# Patient Record
Sex: FEMALE | Race: OTHER | HISPANIC OR LATINO | Employment: FULL TIME | URBAN - METROPOLITAN AREA
[De-identification: names, ages, dates, MRNs, and addresses within clinical notes are randomized per-mention and may not be internally consistent; named-entity substitution may affect disease eponyms.]

---

## 2017-01-27 ENCOUNTER — CONVERSION ENCOUNTER (OUTPATIENT)
Dept: MAMMOGRAPHY | Facility: CLINIC | Age: 57
End: 2017-01-27

## 2017-01-31 LAB
25(OH)D3 SERPL-MCNC: 65 NG/ML (ref 30–100)
ALBUMIN SERPL-MCNC: 4.3 G/DL (ref 3.6–5.1)
ALBUMIN/GLOB SERPL: 1.8 (CALC) (ref 1–2.5)
ALP SERPL-CCNC: 109 U/L (ref 33–130)
ALT SERPL-CCNC: 11 U/L (ref 6–29)
AST SERPL-CCNC: 18 U/L (ref 10–35)
BASOPHILS # BLD AUTO: 11 CELLS/UL (ref 0–200)
BASOPHILS NFR BLD AUTO: 0.2 %
BILIRUB SERPL-MCNC: 0.4 MG/DL (ref 0.2–1.2)
BUN SERPL-MCNC: 17 MG/DL (ref 7–25)
BUN/CREAT SERPL: NORMAL (CALC) (ref 6–22)
CALCIUM SERPL-MCNC: 9.2 MG/DL (ref 8.6–10.4)
CHLORIDE SERPL-SCNC: 106 MMOL/L (ref 98–110)
CHOLEST SERPL-MCNC: 222 MG/DL (ref 125–200)
CHOLEST/HDLC SERPL: 2.7 (CALC)
CO2 SERPL-SCNC: 23 MMOL/L (ref 20–31)
CREAT SERPL-MCNC: 0.68 MG/DL (ref 0.5–1.05)
EOSINOPHIL # BLD AUTO: 62 CELLS/UL (ref 15–500)
EOSINOPHIL NFR BLD AUTO: 1.1 %
ERYTHROCYTE [DISTWIDTH] IN BLOOD BY AUTOMATED COUNT: 14.2 % (ref 11–15)
GLOBULIN SER CALC-MCNC: 2.4 G/DL (CALC) (ref 1.9–3.7)
GLUCOSE SERPL-MCNC: 78 MG/DL (ref 65–99)
HBA1C MFR BLD: 5.6 % OF TOTAL HGB
HCT VFR BLD AUTO: 38.5 % (ref 35–45)
HDLC SERPL-MCNC: 81 MG/DL
HGB BLD-MCNC: 12.9 G/DL (ref 11.7–15.5)
LDLC SERPL CALC-MCNC: 126 MG/DL (CALC)
LYMPHOCYTES # BLD AUTO: 2279 CELLS/UL (ref 850–3900)
LYMPHOCYTES NFR BLD AUTO: 40.7 %
MCH RBC QN AUTO: 30.4 PG (ref 27–33)
MCHC RBC AUTO-ENTMCNC: 33.7 G/DL (ref 32–36)
MCV RBC AUTO: 90.4 FL (ref 80–100)
MONOCYTES # BLD AUTO: 358 CELLS/UL (ref 200–950)
MONOCYTES NFR BLD AUTO: 6.4 %
NEUTROPHILS # BLD AUTO: 2890 CELLS/UL (ref 1500–7800)
NEUTROPHILS NFR BLD AUTO: 51.6 %
NONHDLC SERPL-MCNC: 141 MG/DL (CALC)
PLATELET # BLD AUTO: 223 THOUSAND/UL (ref 140–400)
PMV BLD REES-ECKER: 11.6 FL (ref 7.5–12.5)
POTASSIUM SERPL-SCNC: 4.7 MMOL/L (ref 3.5–5.3)
PROT SERPL-MCNC: 6.7 G/DL (ref 6.1–8.1)
RBC # BLD AUTO: 4.26 MILLION/UL (ref 3.8–5.1)
SL AMB EGFR AFRICAN AMERICAN: 112 ML/MIN/1.73M2
SL AMB EGFR NON AFRICAN AMERICAN: 96 ML/MIN/1.73M2
SODIUM SERPL-SCNC: 142 MMOL/L (ref 135–146)
TRIGL SERPL-MCNC: 74 MG/DL
TSH SERPL-ACNC: 0.92 MIU/L (ref 0.4–4.5)
WBC # BLD AUTO: 5.6 THOUSAND/UL (ref 3.8–10.8)

## 2018-01-31 ENCOUNTER — CONVERSION ENCOUNTER (OUTPATIENT)
Dept: MAMMOGRAPHY | Facility: CLINIC | Age: 58
End: 2018-01-31

## 2018-03-05 LAB — HBA1C MFR BLD HPLC: 5 %

## 2018-07-28 LAB
ALBUMIN SERPL-MCNC: 4.1 G/DL (ref 3.6–5.1)
ALBUMIN/GLOB SERPL: 1.9 (CALC) (ref 1–2.5)
ALP SERPL-CCNC: 99 U/L (ref 33–130)
ALT SERPL-CCNC: 10 U/L (ref 6–29)
AST SERPL-CCNC: 12 U/L (ref 10–35)
BASOPHILS # BLD AUTO: 51 CELLS/UL (ref 0–200)
BASOPHILS NFR BLD AUTO: 1 %
BILIRUB SERPL-MCNC: 0.5 MG/DL (ref 0.2–1.2)
BUN SERPL-MCNC: 20 MG/DL (ref 7–25)
BUN/CREAT SERPL: NORMAL (CALC) (ref 6–22)
CALCIUM SERPL-MCNC: 9.2 MG/DL (ref 8.6–10.4)
CHLORIDE SERPL-SCNC: 106 MMOL/L (ref 98–110)
CHOLEST SERPL-MCNC: 205 MG/DL
CHOLEST/HDLC SERPL: 2.7 (CALC)
CO2 SERPL-SCNC: 29 MMOL/L (ref 20–31)
CREAT SERPL-MCNC: 0.73 MG/DL (ref 0.5–1.05)
EOSINOPHIL # BLD AUTO: 82 CELLS/UL (ref 15–500)
EOSINOPHIL NFR BLD AUTO: 1.6 %
ERYTHROCYTE [DISTWIDTH] IN BLOOD BY AUTOMATED COUNT: 12.3 % (ref 11–15)
GLOBULIN SER CALC-MCNC: 2.2 G/DL (CALC) (ref 1.9–3.7)
GLUCOSE SERPL-MCNC: 85 MG/DL (ref 65–99)
HCT VFR BLD AUTO: 36.5 % (ref 35–45)
HDLC SERPL-MCNC: 77 MG/DL
HGB BLD-MCNC: 12 G/DL (ref 11.7–15.5)
LDLC SERPL CALC-MCNC: 110 MG/DL (CALC)
LYMPHOCYTES # BLD AUTO: 1678 CELLS/UL (ref 850–3900)
LYMPHOCYTES NFR BLD AUTO: 32.9 %
MCH RBC QN AUTO: 31 PG (ref 27–33)
MCHC RBC AUTO-ENTMCNC: 32.9 G/DL (ref 32–36)
MCV RBC AUTO: 94.3 FL (ref 80–100)
MONOCYTES # BLD AUTO: 464 CELLS/UL (ref 200–950)
MONOCYTES NFR BLD AUTO: 9.1 %
NEUTROPHILS # BLD AUTO: 2825 CELLS/UL (ref 1500–7800)
NEUTROPHILS NFR BLD AUTO: 55.4 %
NONHDLC SERPL-MCNC: 128 MG/DL (CALC)
PLATELET # BLD AUTO: 218 THOUSAND/UL (ref 140–400)
PMV BLD REES-ECKER: 12.6 FL (ref 7.5–12.5)
POTASSIUM SERPL-SCNC: 4.7 MMOL/L (ref 3.5–5.3)
PROT SERPL-MCNC: 6.3 G/DL (ref 6.1–8.1)
RBC # BLD AUTO: 3.87 MILLION/UL (ref 3.8–5.1)
SL AMB EGFR AFRICAN AMERICAN: 104 ML/MIN/1.73M2
SL AMB EGFR NON AFRICAN AMERICAN: 90 ML/MIN/1.73M2
SODIUM SERPL-SCNC: 143 MMOL/L (ref 135–146)
T4 FREE SERPL-MCNC: 1.4 NG/DL (ref 0.8–1.8)
TRIGL SERPL-MCNC: 89 MG/DL
TSH SERPL-ACNC: 3.76 MIU/L (ref 0.4–4.5)
WBC # BLD AUTO: 5.1 THOUSAND/UL (ref 3.8–10.8)

## 2018-12-07 RX ORDER — LEVOTHYROXINE SODIUM 0.05 MG/1
60 TABLET ORAL EVERY 24 HOURS
COMMUNITY
Start: 2016-01-15 | End: 2019-03-13 | Stop reason: ALTCHOICE

## 2018-12-07 RX ORDER — ERGOCALCIFEROL 1.25 MG/1
CAPSULE ORAL
Refills: 2 | COMMUNITY
Start: 2018-09-18 | End: 2020-10-01 | Stop reason: ALTCHOICE

## 2018-12-07 RX ORDER — VENLAFAXINE HYDROCHLORIDE 75 MG/1
CAPSULE, EXTENDED RELEASE ORAL
Refills: 0 | COMMUNITY
Start: 2018-10-05 | End: 2019-03-14 | Stop reason: SDUPTHER

## 2018-12-07 RX ORDER — LEVOTHYROXINE SODIUM 137 UG/1
TABLET ORAL
Refills: 2 | COMMUNITY
Start: 2018-11-19 | End: 2018-12-12 | Stop reason: DRUGHIGH

## 2018-12-12 ENCOUNTER — OFFICE VISIT (OUTPATIENT)
Dept: CARDIOLOGY CLINIC | Facility: CLINIC | Age: 58
End: 2018-12-12
Payer: COMMERCIAL

## 2018-12-12 VITALS
WEIGHT: 145 LBS | HEART RATE: 62 BPM | BODY MASS INDEX: 24.16 KG/M2 | OXYGEN SATURATION: 98 % | HEIGHT: 65 IN | SYSTOLIC BLOOD PRESSURE: 120 MMHG | DIASTOLIC BLOOD PRESSURE: 70 MMHG

## 2018-12-12 DIAGNOSIS — Z87.898 HISTORY OF PALPITATIONS: ICD-10-CM

## 2018-12-12 DIAGNOSIS — E78.2 MIXED HYPERLIPIDEMIA: Primary | ICD-10-CM

## 2018-12-12 PROCEDURE — 93000 ELECTROCARDIOGRAM COMPLETE: CPT | Performed by: INTERNAL MEDICINE

## 2018-12-12 PROCEDURE — 99214 OFFICE O/P EST MOD 30 MIN: CPT | Performed by: INTERNAL MEDICINE

## 2018-12-12 NOTE — PROGRESS NOTES
Ami [address]    [address], [address]  [address]  Phone#  [phone]  Fax#  [phone]  *-*-*-*-*-*-*-*-*-*-*-*-*-*-*-*-*-*-*-*-*-*-*-*-*-*-*-*-*-*-*-*-*-*-*-*-*-*-*-*-*-*-*-*-*-*-*-*-*-*-*-*-*-*    Candido Georgen DATE: 12/12/18 10:07 AM    PATIENT NAME: Cirilo Tse   1960    [id number]  Age: 60 y o  Sex: female    AUTHOR: Nia Chambers MD  PRIMARYCARE PHYSICIAN: Trisha Salguero MD    DIAGNOSES:  1  Benign essential hypertension,  Currently blood pressure controlled without any medications  2  Mixed dyslipidemia  3  Hypothyroidism  4  History of depression  5  Asthma  6  GERD     Echocardiogram in September 2017 showed normal left ventricular size and systolic function, grade 1 diastolic dysfunction, no significant chamber hypertrophy or enlargement, aortic valve sclerosis, trace mitral and trace to mild tricuspid  Valve regurgitation, no pulmonary hypertension no pericardial effusion  Exercise treadmill stress test in September 2017 demonstrated good exercise capacity, no ECG changes of ischemia at 90% maximal predicted heart rate  Normal resting blood pressure with appropriate blood pressure response to exercise  Cardiac catheterization in 2012 showed angiographically normal coronary arteries and normal left ventricular function  CURRENT ECG:  Results for orders placed or performed in visit on 12/12/18   POCT ECG    Narrative     Normal sinus rhythm, HR 62 beats per min, normal axis intervals, no significant ST T wave abnormalities  CARDIOLOGY ASSESSMENT & PLAN:  History of palpitations   This condition has been stable with no recent significant symptoms  ECGs overall normal   Extensive workup in 2017 was overall negative  No specific intervention is needed at this time  - Patient is advised to continue current medical therapy  - Dietary and medical compliance are reinforced    - Advised  to report any concerning symptoms such as chest pain, shortness of breath, decline in exercise tolerance or presyncope/syncope  Mixed hyperlipidemia    Reasonable lipid profile at last blood work in July  Will hold off on therapy at this time  Will consider it if there is increasing trend  In the future  INTERVAL HISTORY & HISTORY OF PRESENT ILLNESS:  Nikita Browning is here for follow-up regarding her cardiac comorbidities which include:    Dyslipidemia and palpitations  She is here accompanied with her  for routine follow-up visit  She has been overall doing well with no recent subjective cardiac complaints  There have been no recent active symptoms of chest discomfort or exertional angina  There is no dyspnea with usual activities or exertion  No orthopnea, PND or pedal edema  She reports occasional brief palpitations but no lightheadedness, presyncope /syncope  Denies any recent hospitalizations or other illnesses  Reports being compliant with medications  REVIEW OF SYMPTOMS:    Positive for:   Occasional brief palpitations  Negative for: All remaining as reviewed below and in HPI  SYSTEM SYMPTOMS REVIEWED:  General--weight change, fever, night sweats  Respirato      Cardiovascular--chest pain, syncope, dyspnea on exertion, edema, decline in exercise tolerance, claudication   Gastrointestinal--persistent vomiting, diarrhea, abdominal distention, blood in stool   Muscular or skeletal--joint pain or swelling   Neurologic--headaches, syncope, abnormal movement  Hematologic--history of easy bruising and bleeding   Endocrine--thyroid enlargement, heat or cold intolerance, polyuria   Psychiatric--anxiety, depression     *-*-*-*-*-*-*-*-*-*-*-*-*-*-*-*-*-*-*-*-*-*-*-*-*-*-*-*-*-*-*-*-*-*-*-*-*-*-*-*-*-*-*-*-*-*-*-*-*-*-*-*-*-*-  VITAL SIGNS:  Vitals:    12/12/18 0937   BP: 120/70   BP Location: Left arm   Patient Position: Sitting   Cuff Size: Adult   Pulse: 62   SpO2: 98%   Weight: 65 8 kg (145 lb) Height: 5' 5" (1 651 m)       *-*-*-*-*-*-*-*-*-*-*-*-*-*-*-*-*-*-*-*-*-*-*-*-*-*-*-*-*-*-*-*-*-*-*-*-*-*-*-*-*-*-*-*-*-*-*-*-*-*-*-*-*-*-  PHYSICAL EXAM: the  General Appearance:    Alert, cooperative, no distress, appears stated age   Head, Eyes, ENT:    No obvious abnormality, moist mucous mebranes  Neck:   Supple, no carotid bruit or JVD   Back:     Symmetric, no curvature  Lungs:     Respirations unlabored  Clear to auscultation bilaterally,    Chest wall:    No tenderness or deformity   Heart:    Regular rate and rhythm, S1 and S2 normal, no murmur, rub  or gallop  Abdomen:     Soft, non-tender, No obvious masses, or organomegaly   Extremities:   Extremities normal, no cyanosis or edema    Skin:   Skin color, texture, turgor normal, no rashes or lesions     *-*-*-*-*-*-*-*-*-*-*-*-*-*-*-*-*-*-*-*-*-*-*-*-*-*-*-*-*-*-*-*-*-*-*-*-*-*-*-*-*-*-*-*-*-*-*-*-*-*-*-*-*-*-  CURRENT MEDICATION LIST:    Current Outpatient Prescriptions:     ergocalciferol (VITAMIN D2) 50,000 units, TK 1 C PO TWO TIMES A WEEK, Disp: , Rfl: 2    levothyroxine 50 mcg tablet, 60 mcg every 24 hours  , Disp: , Rfl:     venlafaxine (EFFEXOR-XR) 75 mg 24 hr capsule, TAKE ONE CAPSULE PO QD WITH FOOD, Disp: , Rfl: 0    ALLERGIES:  Allergies   Allergen Reactions    No Active Allergies        *-*-*-*-*-*-*-*-*-*-*-*-*-*-*-*-*-*-*-*-*-*-*-*-*-*-*-*-*-*-*-*-*-*-*-*-*-*-*-*-*-*-*-*-*-*-*-*-*-*-*-*-*-*-    LABORATORY DATA:  I have personally reviewed pertinent labs  Labs from July 1990 gait normal renal function, LFTs, thyroid and CBC      Potassium   Date Value Ref Range Status   07/27/2018 4 7 3 5 - 5 3 mmol/L Final   01/31/2017 4 7 3 5 - 5 3 mmol/L Final     Chloride   Date Value Ref Range Status   07/27/2018 106 98 - 110 mmol/L Final   01/31/2017 106 98 - 110 mmol/L Final     CO2   Date Value Ref Range Status   07/27/2018 29 20 - 31 mmol/L Final   01/31/2017 23 20 - 31 mmol/L Final     BUN   Date Value Ref Range Status   07/27/2018 20 7 - 25 mg/dL Final   01/31/2017 17 7 - 25 mg/dL Final     SL AMB CALCIUM   Date Value Ref Range Status   07/27/2018 9 2 8 6 - 10 4 mg/dL Final   01/31/2017 9 2 8 6 - 10 4 mg/dL Final     Alkaline Phosphatase   Date Value Ref Range Status   07/27/2018 99 33 - 130 U/L Final   01/31/2017 109 33 - 130 U/L Final     White Blood Cell Count   Date Value Ref Range Status   07/27/2018 5 1 3 8 - 10 8 Thousand/uL Final   01/31/2017 5 6 3 8 - 10 8 Thousand/uL Final     Hemoglobin   Date Value Ref Range Status   07/27/2018 12 0 11 7 - 15 5 g/dL Final   01/31/2017 12 9 11 7 - 15 5 g/dL Final     Platelet Count   Date Value Ref Range Status   07/27/2018 218 140 - 400 Thousand/uL Final   01/31/2017 223 140 - 400 Thousand/uL Final     No results found for: PT, PTT, INR  No results found for: CKMB, BNP, DIGOXIN  TSH   Date Value Ref Range Status   07/27/2018 3 76 0 40 - 4 50 mIU/L Final   01/31/2017 0 92 0 40 - 4 50 mIU/L Final     HDL   Date Value Ref Range Status   07/27/2018 77 >50 mg/dL Final     Triglycerides   Date Value Ref Range Status   07/27/2018 89 <150 mg/dL Final      Hemoglobin A1C   Date Value Ref Range Status   01/31/2017 5 6 <5 7 % of total Hgb Final     Comment:     According to ADA guidelines, hemoglobin A1c <7 0%  represents optimal control in non-pregnant diabetic  patients  Different metrics may apply to specific  patient populations  Standards of Medical Care in    Diabetes Care  2013;36:s11-s66     For the purpose of screening for the presence of  diabetes  <5 7%       Consistent with the absence of diabetes  5 7-6 4%    Consistent with increased risk for diabetes              (prediabetes)  >or=6 5%    Consistent with diabetes     This assay result is consistent with a decreased risk  of diabetes  Currently, no consensus exists for use of hemoglobin  A1c for diagnosis of diabetes for children  NO COLLECTION DATE RECEIVED   WE HAVE USED  THE DATE THE SPECIMEN WAS RECEIVED BY THIS  LABORATORY AS THE COLLECTION DATE  IF THIS  IS INCORRECT, PLEASE CONTACT CLIENT SERVICES  PHONE NUMBER: [phone]       No results found for: Lourdes Emerson, GRAMSTAIN, [address], WOUNDCULT, BODYFLUIDCUL, MRSACULTURE, INFLUAPCR, INFLUBPCR, RSVPCR, LEGIONELLAUR, CDIFFTOXINB    *-*-*-*-*-*-*-*-*-*-*-*-*-*-*-*-*-*-*-*-*-*-*-*-*-*-*-*-*-*-*-*-*-*-*-*-*-*-*-*-*-*-*-*-*-*-*-*-*-*-*-*-*-*-  PREVIOUS CARDIOLOGY & RADIOLOGY RESULTS:  No results found for this or any previous visit  No results found for this or any previous visit  No results found for this or any previous visit  No results found for this or any previous visit  Patient was never admitted  *-*-*-*-*-*-*-*-*-*-*-*-*-*-*-*-*-*-*-*-*-*-*-*-*-*-*-*-*-*-*-*-*-*-*-*-*-*-*-*-*-*-*-*-*-*-*-*-*-*-*-*-*-*-  SIGNATURES:   @PEK@   Nia Chambers MD     CC:   MD Edi Ko MD   *-*-*-*-*-*-*-*-*-*-*-*-*-*-*-*-*-*-*-*-*-*-*-*-*-*-*-*-*-*-*-*-*-*-*-*-*-*-*-*-*-*-*-*-*-*-*-*-*-*-*-*-*-*-    Social History     Social History    Marital status: /Civil Union     Spouse name: N/A    Number of children: N/A    Years of education: N/A     Occupational History    Not on file       Social History Main Topics    Smoking status: Never Smoker    Smokeless tobacco: Never Used    Alcohol use Not on file    Drug use: Unknown    Sexual activity: Not on file     Other Topics Concern    Not on file     Social History Narrative    No narrative on file      Family History   Problem Relation Age of Onset    Heart attack Mother 61     Past Surgical History:   Procedure Laterality Date    BREAST BIOPSY Left 2015    normal    BREAST CYST EXCISION Bilateral     BREAST LUMPECTOMY Bilateral 2007    benign    CHOLECYSTECTOMY      COLONOSCOPY W/ POLYPECTOMY  09/2017    HYSTERECTOMY

## 2018-12-12 NOTE — PATIENT INSTRUCTIONS
CARDIOLOGY ASSESSMENT & PLAN:  History of palpitations   This condition has been stable with no recent significant symptoms  ECGs overall normal   Extensive workup in 2017 was overall negative  No specific intervention is needed at this time  - Patient is advised to continue current medical therapy  - Dietary and medical compliance are reinforced  - Advised  to report any concerning symptoms such as chest pain, shortness of breath, decline in exercise tolerance or presyncope/syncope  Mixed hyperlipidemia    Reasonable lipid profile at last blood work in July  Will hold off on therapy at this time  Will consider it if there is increasing trend  In the future  DIAGNOSES:  1  Benign essential hypertension,  Currently blood pressure controlled without any medications  2  Mixed dyslipidemia  3  Hypothyroidism  4  History of depression  5  Asthma  6  GERD     Echocardiogram in September 2017 showed normal left ventricular size and systolic function, grade 1 diastolic dysfunction, no significant chamber hypertrophy or enlargement, aortic valve sclerosis, trace mitral and trace to mild tricuspid  Valve regurgitation, no pulmonary hypertension no pericardial effusion  Exercise treadmill stress test in September 2017 demonstrated good exercise capacity, no ECG changes of ischemia at 90% maximal predicted heart rate  Normal resting blood pressure with appropriate blood pressure response to exercise  Cardiac catheterization in 2012 showed angiographically normal coronary arteries and normal left ventricular function  CURRENT ECG:  Results for orders placed or performed in visit on 12/12/18   POCT ECG    Narrative     Normal sinus rhythm, HR 62 beats per min, normal axis intervals, no significant ST T wave abnormalities

## 2018-12-12 NOTE — ASSESSMENT & PLAN NOTE
Reasonable lipid profile at last blood work in July  Will hold off on therapy at this time  Will consider it if there is increasing trend  In the future

## 2018-12-12 NOTE — ASSESSMENT & PLAN NOTE
This condition has been stable with no recent significant symptoms  ECGs overall normal   Extensive workup in 2017 was overall negative  No specific intervention is needed at this time  - Patient is advised to continue current medical therapy  - Dietary and medical compliance are reinforced  - Advised  to report any concerning symptoms such as chest pain, shortness of breath, decline in exercise tolerance or presyncope/syncope

## 2019-01-08 ENCOUNTER — OFFICE VISIT (OUTPATIENT)
Dept: FAMILY MEDICINE CLINIC | Facility: CLINIC | Age: 59
End: 2019-01-08
Payer: COMMERCIAL

## 2019-01-08 VITALS
RESPIRATION RATE: 16 BRPM | HEART RATE: 65 BPM | SYSTOLIC BLOOD PRESSURE: 132 MMHG | OXYGEN SATURATION: 98 % | WEIGHT: 147 LBS | HEIGHT: 65 IN | DIASTOLIC BLOOD PRESSURE: 82 MMHG | BODY MASS INDEX: 24.49 KG/M2 | TEMPERATURE: 98.9 F

## 2019-01-08 DIAGNOSIS — Z12.31 ENCOUNTER FOR SCREENING MAMMOGRAM FOR MALIGNANT NEOPLASM OF BREAST: ICD-10-CM

## 2019-01-08 DIAGNOSIS — J32.0 CHRONIC MAXILLARY SINUSITIS: Primary | ICD-10-CM

## 2019-01-08 PROCEDURE — 3008F BODY MASS INDEX DOCD: CPT | Performed by: NURSE PRACTITIONER

## 2019-01-08 PROCEDURE — 99213 OFFICE O/P EST LOW 20 MIN: CPT | Performed by: NURSE PRACTITIONER

## 2019-01-08 RX ORDER — FLUTICASONE PROPIONATE 50 MCG
2 SPRAY, SUSPENSION (ML) NASAL DAILY
Qty: 16 G | Refills: 0 | Status: SHIPPED | OUTPATIENT
Start: 2019-01-08 | End: 2019-01-08

## 2019-01-08 RX ORDER — AMOXICILLIN AND CLAVULANATE POTASSIUM 875; 125 MG/1; MG/1
1 TABLET, FILM COATED ORAL EVERY 12 HOURS SCHEDULED
Qty: 14 TABLET | Refills: 0 | Status: SHIPPED | OUTPATIENT
Start: 2019-01-08 | End: 2019-01-15

## 2019-01-08 RX ORDER — MOMETASONE FUROATE 50 UG/1
2 SPRAY, METERED NASAL DAILY
Qty: 17 G | Refills: 0 | Status: SHIPPED | OUTPATIENT
Start: 2019-01-08 | End: 2020-04-01 | Stop reason: ALTCHOICE

## 2019-01-08 RX ORDER — BENZONATATE 200 MG/1
200 CAPSULE ORAL 3 TIMES DAILY PRN
Qty: 20 CAPSULE | Refills: 0 | Status: SHIPPED | OUTPATIENT
Start: 2019-01-08 | End: 2019-03-13 | Stop reason: ALTCHOICE

## 2019-01-08 NOTE — ASSESSMENT & PLAN NOTE
I am prescribing an antibiotic along with steroid nasal spray  Pt recommended to use neti pot to help with sinus pressure  Also recommended use of humidifier at night to ease with breathing  recommend analgesics, topical intranasal steroids, and/or nasal saline irrigation for symptomatic relief of viral rhinosinusitis  Patient to return to clinic if s/s do not improve

## 2019-01-08 NOTE — PATIENT INSTRUCTIONS
Sinusitis   LO QUE NECESITA SABER:   ¿Qué es la sinusitis? La sinusitis es la inflamación o infección de los senos paranasales  La mayoría de las veces es a causa de un virus  La sinusitis aguda podría durar hasta 12 semanas  La sinusitis crónica dura más de 12 semanas  La sinusitis recurrente significa que la padece 4 o más veces en 1 año  ¿Qué aumenta mi riesgo de tener sinusitis? · Las condiciones de aurea, salvador las infecciones de las vías respiratorias superiores, las [place], el asma o la fibrosis quística    · Las infecciones dentales o procedimientos, tales salvador las infecciones de encías, caries o un conducto radicular    · Fumar    · Las estructuras anormales de los senos paranasales, salvador bultos en la nariz, inflamación de las amígdalas o desviación del tabique nasal    · Un sistema inmunológico débil debido a las enfermedades salvador la diabetes o el VIH  ¿Cuáles son los signos y síntomas de la sinusitis? · Lonia Ahmadi o escalofríos    · Dolor, presión, enrojecimiento o inflamación alrededor de la frente, las mejillas o los ojos    · Secreción espesa de color amarillo o alejandro que sale de la nariz    · Sensibilidad al tocarse el holly encima de los senos paranasales    · Tos seca que ocurre mayormente por la noche o al [employer]    · Dolor de [place] y en el holly que empeora al inclinarse hacia adelante    · Dolor en los dientes o al masticar  ¿Cómo se diagnostica la sinusitis? Ag médico lo examinará y le hará preguntas acerca de monika síntomas  Usará un espéculo nasal para revisar dentro de ag nariz  Un espéculo es un instrumento pequeño que se [place] para abrir las fosas nasales  Es posible que [place] de mucosidad de ag nariz muestre qué germen está provocando ag infección  ¿Cómo se trata la sinusitis? Monika síntomas podrían desaparecer por [employer]  Ag médico puede recomendar kassi espera atenta hasta 10 días antes de iniciar el tratamiento con antibióticos   Es posible que usted necesite alguno de los siguientes:  · Acetaminofeno:  clyde el dolor y baja la fiebre  Está disponible sin receta médica  Pregunte la cantidad y la frecuencia con que debe tomarlos  [id number]  [place] las etiquetas de todos los demás medicamentos que esté usando para saber si también contienen acetaminofén, o pregunte a ag médico o farmacéutico  El acetaminofén puede causar daño en el hígado cuando no se nathen de forma correcta  No use más de 4 gramos (4000 miligramos) en total de acetaminofeno en un día  · AINEs (Analgésicos antiinflamatorios no esteroides) salvador el ibuprofeno, ayudan a disminuir la inflamación, el dolor y la [place]  Jodi medicamento esta disponible con o sin kassi receta médica  Los AINEs pueden causar sangrado estomacal o problemas renales en ciertas personas  Si usted nathen un medicamento anticoagulante, siempre pregúntele a ag médico si los MARY son seguros para usted  Siempre gladys la etiqueta de jodi medicamento y [place] instrucciones  · Los aerosoles nasales con esteroides  podrían ayudar a disminuir la inflamación de ag nariz y senos paranasales  · Los descongestionantes  ayudan a reducir la inflamación y a drenar la mucosidad en la nariz y los senos paranasales  Los descongestionantes podrían ayudarle a respirar más fácilmente  · Los antihistamínicos  ayudan a secar la mucosidad en ag [place] y a aliviar los estornudos  · Antibióticos  ayudan a tratar o prevenir infecciones bacteriales  ¿Cómo puedo controlar los síntomas? · Enjuague virginia senos paranasales  Use un aparato para enjuagar virginia fosas nasales con kassi solución salina (agua salada) o agua destilada  No use agua de la llave  [place] ayudará a diluir la mucosidad en la nariz eliminando el polen y la suciedad  También ayudará a reducir la inflamación para que usted pueda respirar normalmente  Pregunte a ag médico con qué frecuencia hacerlo  · Respire vapor  [place] agua en un sartén hasta que salga vapor   [place] cuenco y adrianna kassi carpa con kassi toalla talat  Respire profundamente por aproximadamente 20 minutos  Tenga cuidado de no acercarse demasiado al vapor o de quemarse  Adrianna esto 3 veces al día  Usted también puede respirar profundamente mientras nathen kassi ducha caliente  · Duerma con la [place]  Coloque kassi almohada adicional debajo de ag marine antes de dormir para ayudar a drenar monika senos paranasales  · [address] se le haya indicado  Pregunte a ag médico sobre la cantidad de líquido que necesita yuri todos los días y cuáles le recomienda  Los líquidos van a diluir la mucosidad en ag Geanie Askew y [place] a drenarla  Evite las bebidas que contienen alcohol o cafeína  · No fume y evite el humo de [place]  La nicotina y otros químicos en los cigarrillos y cigarros pueden empeorar monika síntomas  Pida información a ag médico si usted actualmente fuma y necesita ayuda para dejar de fumar  Los cigarrillos electrónicos o tabaco sin humo todavía contienen nicotina  Consulte con ag médico antes de [employer]  ¿Cómo puedo ayudar a evitar el contagio de los gérmenes que provocan la sinusitis? Lávese las malinda frecuentemente con agua y [place]isha  [employer] las malinda después de usar el baño, cambiarle el pañal a un tyler o estornudar  Lávese las malinda antes de comer o preparar alimentos  ¿Cuándo felipe buscar atención inmediata? · Ag justo y párpado están enrojecidos, inflamados y adoloridos  · Usted no puede abrir ag justo  · Usted tiene cambios en la visión, salvador visión doble  · Ag globo ocular sobresale o usted no puede  ag justo  · Usted tiene más sueño de lo normal o nota cambios en ag habilidad de pensar, moverse o hablar  · Usted tiene el arun rígido, fiebre o un iram dolor de marine  · Usted tiene inflamada la frente o el cuero cabelludo  ¿Cuándo felipe comunicarme con mi médico?   · Monika síntomas no mejoran después de 3 días      · Monika síntomas no desaparecen después de 10 días  · Usted tiene náuseas y vómitos  · Le sangra la Geanie Askew  · Usted tiene preguntas o inquietudes acerca de muñoz condición o cuidado  ACUERDOS SOBRE MUÑOZ CUIDADO:   Usted tiene el derecho de ayudar a planear muñoz cuidado  Aprenda todo lo que pueda sobre muñoz condición y salvador darle tratamiento  Discuta virginia opciones de tratamiento con virginia médicos para decidir el cuidado que usted desea recibir  Usted siempre tiene el derecho de rechazar el tratamiento  Esta información es sólo para uso en educación  Muñoz intención no es darle un consejo médico sobre enfermedades o tratamientos  Colsulte con muñoz Malinda Castanedamel farmacéutico antes de seguir cualquier régimen médico para saber si es seguro y efectivo para usted  © 2017 [employer]  Information is for End User's use only and may not be sold, redistributed or otherwise used for commercial purposes  All illustrations and images included in CareNotes® are the copyrighted property of [employer]  or Zaheer Hudson

## 2019-02-01 ENCOUNTER — HOSPITAL ENCOUNTER (OUTPATIENT)
Dept: MAMMOGRAPHY | Facility: CLINIC | Age: 59
Discharge: HOME | End: 2019-02-01
Payer: COMMERCIAL

## 2019-02-01 VITALS — WEIGHT: 147 LBS | BODY MASS INDEX: 24.49 KG/M2 | HEIGHT: 65 IN

## 2019-02-01 DIAGNOSIS — Z12.31 ENCOUNTER FOR SCREENING MAMMOGRAM FOR MALIGNANT NEOPLASM OF BREAST: ICD-10-CM

## 2019-02-01 PROCEDURE — 77067 SCR MAMMO BI INCL CAD: CPT

## 2019-03-13 ENCOUNTER — OFFICE VISIT (OUTPATIENT)
Dept: FAMILY MEDICINE CLINIC | Facility: CLINIC | Age: 59
End: 2019-03-13
Payer: COMMERCIAL

## 2019-03-13 ENCOUNTER — APPOINTMENT (OUTPATIENT)
Dept: LAB | Facility: HOSPITAL | Age: 59
End: 2019-03-13
Payer: COMMERCIAL

## 2019-03-13 VITALS
BODY MASS INDEX: 24.49 KG/M2 | WEIGHT: 147 LBS | DIASTOLIC BLOOD PRESSURE: 70 MMHG | HEART RATE: 62 BPM | OXYGEN SATURATION: 98 % | TEMPERATURE: 98.2 F | RESPIRATION RATE: 16 BRPM | SYSTOLIC BLOOD PRESSURE: 106 MMHG | HEIGHT: 65 IN

## 2019-03-13 DIAGNOSIS — E03.8 OTHER SPECIFIED HYPOTHYROIDISM: ICD-10-CM

## 2019-03-13 DIAGNOSIS — Z23 NEED FOR TDAP VACCINATION: Primary | ICD-10-CM

## 2019-03-13 DIAGNOSIS — M79.7 FIBROMYALGIA: ICD-10-CM

## 2019-03-13 DIAGNOSIS — G89.4 CHRONIC PAIN DISORDER: ICD-10-CM

## 2019-03-13 DIAGNOSIS — R73.9 HYPERGLYCEMIA: ICD-10-CM

## 2019-03-13 DIAGNOSIS — Z00.01 ENCOUNTER FOR GENERAL ADULT MEDICAL EXAMINATION WITH ABNORMAL FINDINGS: ICD-10-CM

## 2019-03-13 LAB
ALBUMIN SERPL BCP-MCNC: 4.3 G/DL (ref 3–5.2)
ALP SERPL-CCNC: 91 U/L (ref 43–122)
ALT SERPL W P-5'-P-CCNC: 28 U/L (ref 9–52)
ANION GAP SERPL CALCULATED.3IONS-SCNC: 8 MMOL/L (ref 5–14)
AST SERPL W P-5'-P-CCNC: 26 U/L (ref 14–36)
BASOPHILS # BLD AUTO: 0 THOUSANDS/ΜL (ref 0–0.1)
BASOPHILS NFR BLD AUTO: 0 % (ref 0–1)
BILIRUB SERPL-MCNC: 0.4 MG/DL
BUN SERPL-MCNC: 16 MG/DL (ref 5–25)
CALCIUM SERPL-MCNC: 9.5 MG/DL (ref 8.4–10.2)
CHLORIDE SERPL-SCNC: 103 MMOL/L (ref 97–108)
CHOLEST SERPL-MCNC: 204 MG/DL
CO2 SERPL-SCNC: 29 MMOL/L (ref 22–30)
CREAT SERPL-MCNC: 0.53 MG/DL (ref 0.6–1.2)
EOSINOPHIL # BLD AUTO: 0.1 THOUSAND/ΜL (ref 0–0.4)
EOSINOPHIL NFR BLD AUTO: 1 % (ref 0–6)
ERYTHROCYTE [DISTWIDTH] IN BLOOD BY AUTOMATED COUNT: 13.7 %
EST. AVERAGE GLUCOSE BLD GHB EST-MCNC: 126 MG/DL
GFR SERPL CREATININE-BSD FRML MDRD: 104 ML/MIN/1.73SQ M
GLUCOSE P FAST SERPL-MCNC: 91 MG/DL (ref 70–99)
HBA1C MFR BLD: 6 % (ref 4.2–6.3)
HCT VFR BLD AUTO: 39.3 % (ref 36–46)
HDLC SERPL-MCNC: 62 MG/DL (ref 40–59)
HGB BLD-MCNC: 12.9 G/DL (ref 12–16)
LDLC SERPL CALC-MCNC: 125 MG/DL
LYMPHOCYTES # BLD AUTO: 1.7 THOUSANDS/ΜL (ref 0.5–4)
LYMPHOCYTES NFR BLD AUTO: 28 % (ref 25–45)
MCH RBC QN AUTO: 30.5 PG (ref 26–34)
MCHC RBC AUTO-ENTMCNC: 32.9 G/DL (ref 31–36)
MCV RBC AUTO: 93 FL (ref 80–100)
MONOCYTES # BLD AUTO: 0.4 THOUSAND/ΜL (ref 0.2–0.9)
MONOCYTES NFR BLD AUTO: 6 % (ref 1–10)
NEUTROPHILS # BLD AUTO: 3.9 THOUSANDS/ΜL (ref 1.8–7.8)
NEUTS SEG NFR BLD AUTO: 64 % (ref 45–65)
NONHDLC SERPL-MCNC: 142 MG/DL
PLATELET # BLD AUTO: 213 THOUSANDS/UL (ref 150–450)
PMV BLD AUTO: 10.8 FL (ref 8.9–12.7)
POTASSIUM SERPL-SCNC: 4.2 MMOL/L (ref 3.6–5)
PROT SERPL-MCNC: 6.9 G/DL (ref 5.9–8.4)
RBC # BLD AUTO: 4.24 MILLION/UL (ref 4–5.2)
SODIUM SERPL-SCNC: 140 MMOL/L (ref 137–147)
TRIGL SERPL-MCNC: 86 MG/DL
TSH SERPL DL<=0.05 MIU/L-ACNC: 3.67 UIU/ML (ref 0.47–4.68)
WBC # BLD AUTO: 6.2 THOUSAND/UL (ref 4.5–11)

## 2019-03-13 PROCEDURE — 84443 ASSAY THYROID STIM HORMONE: CPT

## 2019-03-13 PROCEDURE — 99396 PREV VISIT EST AGE 40-64: CPT | Performed by: FAMILY MEDICINE

## 2019-03-13 PROCEDURE — 90471 IMMUNIZATION ADMIN: CPT | Performed by: FAMILY MEDICINE

## 2019-03-13 PROCEDURE — 36415 COLL VENOUS BLD VENIPUNCTURE: CPT

## 2019-03-13 PROCEDURE — 86038 ANTINUCLEAR ANTIBODIES: CPT

## 2019-03-13 PROCEDURE — 85025 COMPLETE CBC W/AUTO DIFF WBC: CPT

## 2019-03-13 PROCEDURE — 83036 HEMOGLOBIN GLYCOSYLATED A1C: CPT

## 2019-03-13 PROCEDURE — 90715 TDAP VACCINE 7 YRS/> IM: CPT | Performed by: FAMILY MEDICINE

## 2019-03-13 PROCEDURE — 80061 LIPID PANEL: CPT

## 2019-03-13 PROCEDURE — 80053 COMPREHEN METABOLIC PANEL: CPT

## 2019-03-13 RX ORDER — LEVOTHYROXINE SODIUM 137 UG/1
TABLET ORAL
Refills: 1 | COMMUNITY
Start: 2019-02-23 | End: 2022-01-27 | Stop reason: SDUPTHER

## 2019-03-13 NOTE — PATIENT INSTRUCTIONS
Por favor ankit con detenimiento  Muy Importante!!!    · Ag peso  será revisado  Es posible que [employer] midan ag altura para calcular ag índice de masa corporal [facility])  El [facility] indica si tiene un peso saludable  · Se verificarán ag presión arterial  y frecuencia cardíaca  También pueden revisar ag temperatura  · Exámenes de [place] y [facility]  se podría realizar  Se podrían realizar exámenes de usha para revisar los niveles de [facility]  Los niveles anormales de colesterol aumentan el riesgo de enfermedad del corazón y accidente cerebrovascular  Puede que también necesite kassi prueba de usha u orina para revisar si tiene diabetes si usted está en mayor riesgo  Las pruebas de orina pueden hacerse para buscar signos de kassi infección o kassi enfermedad renal      · Un examen físico  incluye la comprobación de virginia latidos del corazón y los pulmones con un estetoscopio  Ag médico también podría revisarle la piel para buscar daños causados por el sol  · Pruebas de detección  podría recomendarse  Se realiza un examen de detección para detectar enfermedades que pueden no causar síntomas  Los exámenes de detección que necesite dependen de ag edad, género, antecedentes familiares y hábitos de nikita  Por ejemplo, podrían recomendarle la exploración selectiva colorrectal si tiene 48 años o más  ¿Qué exámenes de detección necesito si soy kassi wilda? · El examen de Papanicolaou  se utiliza para detectar cáncer de arun uterino  El examen del Papanicolaou por lo general se realiza entre 3 a 5 años dependiendo de ag edad  Puede necesitarlo más a menudo si usted ha tenido [employer] de la prueba de Papanicolaou en el pasado  Pregunte a ag médico con qué frecuencia debería realizarse un examen de Papanicolaou  · Kassi mamografía  es kassi radiografía de virginia senos para detectar cáncer de mama  Los expertos recomiendan [address] cada 2 años empezando a los 48 años de I[place]   Es probable que usted necesite kassi mamografía a los 52 años o antes si tiene riesgo alto de cáncer de seno  Hable con ag médico sobre cuándo debe empezar con virginia mamografías y con cuánta frecuencia las necesita  ¿Qué vacunas podría necesitar? · Debe recibir Claudia Franki vacuna contra la influenza  todos los Los fiona  La vacuna contra la influenza protege de la gripe  Varios tipos de virus causan la influenza  Debido a que los virus [place] con el Michael, es necesaria la producción de nuevas vacunas cada año  · Debe recibir Claudia Franki vacuna de refuerzo contra el tétanos-difteria (Td)  cada 10 años  Esta vacuna protege contra el tétanos y la difteria  El tétanos es kassi infección severa que puede causar trismo y espasmos musculares dolorosos  La difteria es kassi infección bacterial grave que produce kassi cubierta gruesa en la parte de atrás de la boca y garganta  · Debe recibir la vacuna contra el virus del papiloma humano (VPH)  si usted es wilda y [place] 19 y 32 años o es hombre y [place] 23 y 24 años y nunca la recibió  Esta vacuna protege contra la infección por VPH  El virus del papiloma humano o VPH es la infección más común que se transmite por contacto sexual  El virus del papiloma humano también podría provocar cáncer vaginal, del pene y del ano  · Debe recibir la vacuna antineumocócica  si tiene más de 72 años  La vacuna antineumocócica es kassi inyección que se administra para protegerlo contra kassi enfermedad neumocócica  La enfermedad neumocócica es kassi infección causada por la bacteria neumocócica  La infección puede causar neumonía, meningitis o kassi infección del oído  · Debe recibir la vacuna contra la culebrilla  si tiene [address] [place] [address],[address] [place] [address] o [place], incluso si gonzalez tenido culebrilla antes  La vacuna contra la culebrilla (herpes zóster) es kassi inyección usada para proteger contra el virus zóster que causa la varicela  [place] es el mismo virus que causa la varicela   La culebrilla es un sarpullido doloroso que se desarrolla en personas que tuvieron varicela o gonzalez estado expuestas al virus  ¿Cómo puedo comer de manera saludable? Mi Fitzgerald es un modelo para planear comidas sanas  Muestra los tipos y cantidades de alimentos que deberían ir en un plato  Leonid Omar y verduras representan alrededor de la mitad de ag plato y los granos y proteínas representan la otra mitad  Se incluye kassi porción de productos lácteos al lado del plato  La cantidad de calorías y [address] de las porciones que usted necesita dependen de ag edad, [place], peso y altura  Los ejemplos de alimentos saludables son:  · Consuma kassi variedad de verduras  salvador las de color alejandro oscuro, barrios y [place]  Usted también puede incluir verduras enlatadas bajas en sodio (sal) y verduras congeladas sin mantequilla ni salsas [id number]  · Consuma kassi variedad de fruta frescas , las frutas enlatadas en 100% de jugo , fruta [place] y deepika secos  · Incluya granos enteros  Por lo menos la mitad de los granos que usted consume deben ser granos de ashish integral  Por ejemplo, panes de grano entero, pasta integral, arroz integral y cereales de grano entero salvador la bruce  · Consuma kassi variedad de alimentos con proteínas salvador mariscos (pescado y crustáceos), Gae Alonso y carne de ave sin piel (pavo y eriberto)  Ejemplos de messi magras incluyen pierna, paleta o lomo de puerco y louann, solomillo o, lomo de res y carne [place] de res extra [place]  Otros alimentos ricos en proteínas son los huevos y sustitutos de [place], frijoles, chícharos, productos de soya, nueces y semillas  · Elija productos lácteos bajos en grasa [employer] o del 1% o yogur, queso y requesón bajos en grasa  · Limite las grasas poco saludables,  salvador la [place], la margarina dura y la [place]  ¿Qué cantidad de actividad física necesito? Realice kassi actividad física por lo menos 30 minutos al día, la mayoría de los días de la [place]   Algunos de los ejercicios incluyen caminar, montar en bicicleta, bailar y nadar  Usted también puede realizar más actividad física usando las escaleras en vez de los ascensores o estacionarse más lejos cuando [place] a las tiendas  Incluya ejercicios para fortalecer los músculos 2 días a la semana  El ejercicio regular proporciona muchos beneficios para la aurea  [place] a controlar ag peso y [employer] riesgo de diabetes tipo 2, presión arterial reji, enfermedad del corazón y accidente cerebrovascular  El ejercicio [employer] puede ayudar a mejorar ag estado de ánimo  Pregunte a ag médico acerca del mejor plan de ejercicio para usted  ¿Cuáles son [place] generales de aurea y seguridad que felipe seguir? · No fume  La nicotina y otras sustancias químicas que contienen los cigarrillos y cigarros pueden dañar los pulmones  Pida información a ag médico si usted actualmente fuma y necesita ayuda para dejar de fumar  Los cigarrillos electrónicos o tabaco sin humo todavía contienen nicotina  Consulte con ag médico antes de [employer]  · Limite el consumo de alcohol  Un trago equivale a 12 onzas de cerveza, 5 onzas de vino o 1 onza y ½ de licor  · Baje de peso, si es necesario  El sobrepeso aumenta el riesgo de ciertas condiciones de [place]  Estos incluyen enfermedad del corazón, presión arterial reji, diabetes tipo 2 y ciertos tipos de cáncer  · Proteja ag piel  No tome el sol ni use jett de bronceado  Use protector solar con un SPF 13 o mayor  Aplíquese el bloqueador por lo menos 15 minutos antes de que vaya a estar al [employer]  Vuelva a aplicarse la crema solar cada 2 horas  Use ropa protectora, sombrero y lentes para el sol cuando se encuentre afuera  · Conduzca con seguridad  Use siempre ag cinturón de seguridad  Asegúrese que todos en el malcom usan el cinturón de seguridad  Un cinturón de seguridad puede salvar ag inkita en leo de un accidente automovilístico  No use el celular cuando esté manejando   [place] puede hacer que se distraiga y causar un accidente  Es mejor que pare y se orille si necesita hacer kassi [place] un texto  · Practique el sexo seguro  Use condones de látex si es sexualmente [place] y tiene más de [place] and [place]  [place] médico puede recomendar exámenes de detección de infecciones de transmisión sexual (ITS)  · Use un hung, un chaleco salvavidas y unos implementos de protección  Siempre use un hung al [employer] en bicicleta o motocicleta, esquiar o jugar deportes que podrían causar kassi lesión en la marine  Use implementos de protección cuando practique deportes  Use un chaleco salvavidas cuando esté en un bote o practicando actividades acuáticas

## 2019-03-14 DIAGNOSIS — G89.4 CHRONIC PAIN DISORDER: Primary | ICD-10-CM

## 2019-03-16 LAB — RYE IGE QN: NEGATIVE

## 2019-03-19 RX ORDER — VENLAFAXINE HYDROCHLORIDE 75 MG/1
75 CAPSULE, EXTENDED RELEASE ORAL DAILY
Qty: 30 CAPSULE | Refills: 5 | Status: SHIPPED | OUTPATIENT
Start: 2019-03-19 | End: 2019-10-28 | Stop reason: SDUPTHER

## 2019-10-28 DIAGNOSIS — G89.4 CHRONIC PAIN DISORDER: ICD-10-CM

## 2019-10-28 RX ORDER — VENLAFAXINE HYDROCHLORIDE 75 MG/1
75 CAPSULE, EXTENDED RELEASE ORAL DAILY
Qty: 30 CAPSULE | Refills: 2 | Status: SHIPPED | OUTPATIENT
Start: 2019-10-28 | End: 2020-03-18 | Stop reason: SDUPTHER

## 2019-10-28 NOTE — TELEPHONE ENCOUNTER
Pt called stated she is out of medication, advised her she has no been seen since march she needs to follow up every 6 month for med check doctor might denied medication  She is aware if that's the case she will schedule an appt

## 2020-01-20 ENCOUNTER — TRANSCRIBE ORDERS (OUTPATIENT)
Dept: ADMINISTRATIVE | Facility: HOSPITAL | Age: 60
End: 2020-01-20

## 2020-03-18 DIAGNOSIS — G89.4 CHRONIC PAIN DISORDER: ICD-10-CM

## 2020-03-19 RX ORDER — VENLAFAXINE HYDROCHLORIDE 75 MG/1
75 CAPSULE, EXTENDED RELEASE ORAL DAILY
Qty: 30 CAPSULE | Refills: 2 | Status: SHIPPED | OUTPATIENT
Start: 2020-03-19 | End: 2020-06-16

## 2020-04-01 ENCOUNTER — OFFICE VISIT (OUTPATIENT)
Dept: FAMILY MEDICINE CLINIC | Facility: CLINIC | Age: 60
End: 2020-04-01
Payer: COMMERCIAL

## 2020-04-01 ENCOUNTER — LAB (OUTPATIENT)
Dept: LAB | Facility: HOSPITAL | Age: 60
End: 2020-04-01
Payer: COMMERCIAL

## 2020-04-01 VITALS
OXYGEN SATURATION: 98 % | HEIGHT: 63 IN | SYSTOLIC BLOOD PRESSURE: 118 MMHG | HEART RATE: 72 BPM | WEIGHT: 145 LBS | BODY MASS INDEX: 25.69 KG/M2 | DIASTOLIC BLOOD PRESSURE: 70 MMHG | RESPIRATION RATE: 16 BRPM | TEMPERATURE: 98 F

## 2020-04-01 DIAGNOSIS — E03.9 HYPOTHYROIDISM, UNSPECIFIED TYPE: ICD-10-CM

## 2020-04-01 DIAGNOSIS — Z11.4 SCREENING FOR HUMAN IMMUNODEFICIENCY VIRUS: ICD-10-CM

## 2020-04-01 DIAGNOSIS — M85.88 OSTEOPENIA OF LUMBAR SPINE: Primary | ICD-10-CM

## 2020-04-01 DIAGNOSIS — Z00.01 ENCOUNTER FOR GENERAL ADULT MEDICAL EXAMINATION WITH ABNORMAL FINDINGS: ICD-10-CM

## 2020-04-01 DIAGNOSIS — R29.890 LOSS OF HEIGHT: ICD-10-CM

## 2020-04-01 DIAGNOSIS — J30.89 NON-SEASONAL ALLERGIC RHINITIS, UNSPECIFIED TRIGGER: ICD-10-CM

## 2020-04-01 DIAGNOSIS — Z12.39 SCREENING FOR BREAST CANCER: ICD-10-CM

## 2020-04-01 LAB — TSH SERPL DL<=0.05 MIU/L-ACNC: 2.08 UIU/ML (ref 0.47–4.68)

## 2020-04-01 PROCEDURE — 36415 COLL VENOUS BLD VENIPUNCTURE: CPT

## 2020-04-01 PROCEDURE — 87389 HIV-1 AG W/HIV-1&-2 AB AG IA: CPT

## 2020-04-01 PROCEDURE — 84443 ASSAY THYROID STIM HORMONE: CPT

## 2020-04-01 PROCEDURE — 99396 PREV VISIT EST AGE 40-64: CPT | Performed by: FAMILY MEDICINE

## 2020-04-01 RX ORDER — FLUTICASONE PROPIONATE 50 MCG
1 SPRAY, SUSPENSION (ML) NASAL DAILY
Qty: 16 G | Refills: 0 | Status: SHIPPED | OUTPATIENT
Start: 2020-04-01 | End: 2020-10-01 | Stop reason: ALTCHOICE

## 2020-04-03 LAB — HIV 1+2 AB+HIV1 P24 AG SERPL QL IA: NORMAL

## 2020-06-16 DIAGNOSIS — G89.4 CHRONIC PAIN DISORDER: ICD-10-CM

## 2020-06-16 RX ORDER — VENLAFAXINE HYDROCHLORIDE 75 MG/1
CAPSULE, EXTENDED RELEASE ORAL
Qty: 90 CAPSULE | Refills: 3 | Status: SHIPPED | OUTPATIENT
Start: 2020-06-16 | End: 2021-07-01

## 2020-07-01 ENCOUNTER — HOSPITAL ENCOUNTER (OUTPATIENT)
Dept: MAMMOGRAPHY | Facility: CLINIC | Age: 60
Discharge: HOME | End: 2020-07-01
Payer: COMMERCIAL

## 2020-07-01 ENCOUNTER — HOSPITAL ENCOUNTER (OUTPATIENT)
Dept: BONE DENSITY | Facility: CLINIC | Age: 60
Discharge: HOME | End: 2020-07-01
Payer: COMMERCIAL

## 2020-07-01 VITALS — WEIGHT: 185 LBS | BODY MASS INDEX: 32.78 KG/M2 | HEIGHT: 63 IN

## 2020-07-01 DIAGNOSIS — Z12.39 SCREENING FOR BREAST CANCER: ICD-10-CM

## 2020-07-01 DIAGNOSIS — Z12.31 ENCOUNTER FOR SCREENING MAMMOGRAM FOR MALIGNANT NEOPLASM OF BREAST: ICD-10-CM

## 2020-07-01 DIAGNOSIS — M85.88 OSTEOPENIA OF LUMBAR SPINE: ICD-10-CM

## 2020-07-01 PROCEDURE — 77063 BREAST TOMOSYNTHESIS BI: CPT

## 2020-07-01 PROCEDURE — 77080 DXA BONE DENSITY AXIAL: CPT

## 2020-07-01 PROCEDURE — 77067 SCR MAMMO BI INCL CAD: CPT

## 2020-07-06 NOTE — RESULT ENCOUNTER NOTE
Patient has osteopenia as per her DXA scan  Lifestyle measures include adequate calcium, vitamin D, exercise, smoking cessation if apply, even second hand, counseling on fall prevention, and avoidance of heavy alcohol use was told to patient  In general, 1200 mg of elemental calcium daily, total diet plus supplement, and 800 international units of vitamin D daily

## 2020-07-21 NOTE — PROGRESS NOTES
Assessment/Plan:    Chronic maxillary sinusitis  I am prescribing an antibiotic along with steroid nasal spray  Pt recommended to use neti pot to help with sinus pressure  Also recommended use of humidifier at night to ease with breathing  recommend analgesics, topical intranasal steroids, and/or nasal saline irrigation for symptomatic relief of viral rhinosinusitis  Patient to return to clinic if s/s do not improve  Encounter for screening mammogram for malignant neoplasm of breast  Ordering mammogram       Diagnoses and all orders for this visit:    Chronic maxillary sinusitis  -     Discontinue: fluticasone (FLONASE) 50 mcg/act nasal spray; 2 sprays into each nostril daily  -     amoxicillin-clavulanate (AUGMENTIN) 875-125 mg per tablet; Take 1 tablet by mouth every 12 (twelve) hours for 7 days  -     benzonatate (TESSALON) 200 MG capsule; Take 1 capsule (200 mg total) by mouth 3 (three) times a day as needed for cough  -     mometasone (NASONEX) 50 mcg/act nasal spray; 2 sprays into each nostril daily    Encounter for screening mammogram for malignant neoplasm of breast  -     Mammo screening bilateral w cad; Future          Subjective:      Patient ID: Raisa Mcmahan is a 60 y o  female  Cc  Per pt  Like flu symptoms x 1 week  Positive sick contacts  Headaches and ears hurt  Cough   This is a new problem  The current episode started 1 to 4 weeks ago  The problem has been gradually worsening  The problem occurs every few hours  The cough is non-productive  Associated symptoms include ear congestion, headaches, nasal congestion and postnasal drip  Pertinent negatives include no chest pain, ear pain, fever, sore throat or wheezing  The symptoms are aggravated by cold air  She has tried nothing for the symptoms  There is no history of bronchitis, emphysema, environmental allergies or pneumonia         The following portions of the patient's history were reviewed and updated as appropriate: allergies, current medications, past family history, past medical history, past social history, past surgical history and problem list     Review of Systems   Constitutional: Negative  Negative for fatigue and fever  HENT: Positive for congestion, postnasal drip, sinus pain and sinus pressure  Negative for ear pain, sore throat and trouble swallowing  Eyes: Negative  Respiratory: Positive for cough  Negative for wheezing  Cardiovascular: Negative  Negative for chest pain and palpitations  Gastrointestinal: Negative  Endocrine: Negative  Genitourinary: Negative  Musculoskeletal: Negative  Skin: Negative  Negative for color change  Allergic/Immunologic: Negative  Negative for environmental allergies  Neurological: Positive for headaches  Hematological: Negative  Psychiatric/Behavioral: Negative  Objective:      /82 (BP Location: Left arm, Patient Position: Sitting, Cuff Size: Standard)   Pulse 65   Temp 98 9 °F (37 2 °C) (Oral)   Resp 16   Ht 5' 5" (1 651 m)   Wt 66 7 kg (147 lb)   SpO2 98%   BMI 24 46 kg/m²          Physical Exam   Constitutional: She is oriented to person, place, and time  She appears well-developed and well-nourished  No distress  HENT:   Head: Normocephalic and atraumatic  Right Ear: Hearing, tympanic membrane, external ear and ear canal normal    Left Ear: Hearing, tympanic membrane, external ear and ear canal normal    Nose: Septal deviation present  Right sinus exhibits maxillary sinus tenderness and frontal sinus tenderness  Left sinus exhibits maxillary sinus tenderness and frontal sinus tenderness  Mouth/Throat: Uvula is midline and mucous membranes are normal  Posterior oropharyngeal erythema present  Eyes: Pupils are equal, round, and reactive to light  Conjunctivae and EOM are normal    Cardiovascular: Normal rate, regular rhythm and normal heart sounds  Pulmonary/Chest: Effort normal and breath sounds normal    Abdominal: Soft  Bowel sounds are normal  She exhibits no distension  Musculoskeletal: Normal range of motion  Neurological: She is alert and oriented to person, place, and time  She has normal reflexes  Skin: Skin is warm and dry  She is not diaphoretic  Psychiatric: She has a normal mood and affect  Thought content normal    Nursing note and vitals reviewed  no complications

## 2020-10-01 ENCOUNTER — OFFICE VISIT (OUTPATIENT)
Dept: FAMILY MEDICINE CLINIC | Facility: CLINIC | Age: 60
End: 2020-10-01
Payer: COMMERCIAL

## 2020-10-01 VITALS
WEIGHT: 144 LBS | OXYGEN SATURATION: 99 % | DIASTOLIC BLOOD PRESSURE: 70 MMHG | HEART RATE: 79 BPM | BODY MASS INDEX: 25.52 KG/M2 | RESPIRATION RATE: 16 BRPM | SYSTOLIC BLOOD PRESSURE: 130 MMHG | TEMPERATURE: 98 F | HEIGHT: 63 IN

## 2020-10-01 DIAGNOSIS — R31.0 GROSS HEMATURIA: Primary | ICD-10-CM

## 2020-10-01 LAB
SL AMB  POCT GLUCOSE, UA: ABNORMAL
SL AMB LEUKOCYTE ESTERASE,UA: ABNORMAL
SL AMB POCT BILIRUBIN,UA: ABNORMAL
SL AMB POCT BLOOD,UA: ABNORMAL
SL AMB POCT CLARITY,UA: CLEAR
SL AMB POCT COLOR,UA: YELLOW
SL AMB POCT KETONES,UA: ABNORMAL
SL AMB POCT NITRITE,UA: ABNORMAL
SL AMB POCT PH,UA: 6
SL AMB POCT SPECIFIC GRAVITY,UA: 1.02
SL AMB POCT URINE PROTEIN: ABNORMAL
SL AMB POCT UROBILINOGEN: 0.2

## 2020-10-01 PROCEDURE — 81002 URINALYSIS NONAUTO W/O SCOPE: CPT | Performed by: FAMILY MEDICINE

## 2020-10-01 PROCEDURE — 99214 OFFICE O/P EST MOD 30 MIN: CPT | Performed by: FAMILY MEDICINE

## 2020-10-01 RX ORDER — SULFAMETHOXAZOLE AND TRIMETHOPRIM 800; 160 MG/1; MG/1
1 TABLET ORAL EVERY 12 HOURS SCHEDULED
Qty: 6 TABLET | Refills: 0 | Status: SHIPPED | OUTPATIENT
Start: 2020-10-01 | End: 2020-10-04

## 2020-10-01 RX ORDER — PHENAZOPYRIDINE HYDROCHLORIDE 200 MG/1
200 TABLET, FILM COATED ORAL
Qty: 10 TABLET | Refills: 0 | Status: SHIPPED | OUTPATIENT
Start: 2020-10-01 | End: 2021-04-01 | Stop reason: ALTCHOICE

## 2021-03-23 ENCOUNTER — IMMUNIZATIONS (OUTPATIENT)
Dept: FAMILY MEDICINE CLINIC | Facility: HOSPITAL | Age: 61
End: 2021-03-23

## 2021-03-23 DIAGNOSIS — Z23 ENCOUNTER FOR IMMUNIZATION: Primary | ICD-10-CM

## 2021-03-23 PROCEDURE — 91301 SARS-COV-2 / COVID-19 MRNA VACCINE (MODERNA) 100 MCG: CPT

## 2021-03-23 PROCEDURE — 0011A SARS-COV-2 / COVID-19 MRNA VACCINE (MODERNA) 100 MCG: CPT

## 2021-04-01 ENCOUNTER — OFFICE VISIT (OUTPATIENT)
Dept: FAMILY MEDICINE CLINIC | Facility: CLINIC | Age: 61
End: 2021-04-01
Payer: COMMERCIAL

## 2021-04-01 VITALS
DIASTOLIC BLOOD PRESSURE: 70 MMHG | HEART RATE: 85 BPM | TEMPERATURE: 97.9 F | SYSTOLIC BLOOD PRESSURE: 118 MMHG | HEIGHT: 63 IN | WEIGHT: 152 LBS | RESPIRATION RATE: 16 BRPM | OXYGEN SATURATION: 95 % | BODY MASS INDEX: 26.93 KG/M2

## 2021-04-01 DIAGNOSIS — Z00.01 ENCOUNTER FOR GENERAL ADULT MEDICAL EXAMINATION WITH ABNORMAL FINDINGS: Primary | ICD-10-CM

## 2021-04-01 DIAGNOSIS — J30.89 NON-SEASONAL ALLERGIC RHINITIS, UNSPECIFIED TRIGGER: ICD-10-CM

## 2021-04-01 DIAGNOSIS — E55.9 VITAMIN D DEFICIENCY: ICD-10-CM

## 2021-04-01 DIAGNOSIS — E03.9 HYPOTHYROIDISM, UNSPECIFIED TYPE: ICD-10-CM

## 2021-04-01 PROBLEM — M85.88 OSTEOPENIA OF LUMBAR SPINE: Status: ACTIVE | Noted: 2020-10-14

## 2021-04-01 PROBLEM — R73.01 IFG (IMPAIRED FASTING GLUCOSE): Status: ACTIVE | Noted: 2020-10-14

## 2021-04-01 PROCEDURE — 99396 PREV VISIT EST AGE 40-64: CPT | Performed by: FAMILY MEDICINE

## 2021-04-01 NOTE — PROGRESS NOTES
Assessment/Plan:    No problem-specific Assessment & Plan notes found for this encounter  Diagnoses and all orders for this visit:    Encounter for general adult medical examination with abnormal findings    BMI 26 0-26 9,adult    Non-seasonal allergic rhinitis, unspecified trigger  -     [facility] Allergy Panel, Adult; Future  -     Ambulatory Referral to Otolaryngology; Future    Hypothyroidism, unspecified type  -     TSH, 3rd generation; Future    Vitamin D deficiency  -     Vitamin D 25 hydroxy; Future          Subjective:      Patient ID: Jakub Matos is a 57 y o  female  HPI  Patient is here for PE, not having any acute distress  Patient  complains of cough congestion, sneezing, sore throat and swollen glands for 3 week(s)  Patient denies a history of chest pain and chills and denies a history of asthma  Patient denies smoke cigarettes  Patient tried OTC medications  The following portions of the patient's history were reviewed and updated as appropriate: allergies, current medications, past family history, past medical history, past social history, past surgical history and problem list     Review of Systems   Constitutional: Negative for diaphoresis, fatigue, fever and unexpected weight change  HENT: Positive for congestion and sinus pressure  Respiratory: Negative for cough, chest tightness, shortness of breath and wheezing  Cardiovascular: Negative for chest pain, palpitations and leg swelling  Gastrointestinal: Negative for abdominal pain, blood in stool and constipation  Neurological: Negative for dizziness, syncope, light-headedness and headaches  Hematological: Does not bruise/bleed easily  Psychiatric/Behavioral: Negative for behavioral problems, self-injury and sleep disturbance  The patient is not nervous/anxious            Objective:      /70 (BP Location: Left arm, Patient Position: Sitting, Cuff Size: Standard)   Pulse 85   Temp 97 9 °F (36 6 °C) (Temporal) Resp 16   Ht 5' 3" (1 6 m)   Wt 68 9 kg (152 lb)   SpO2 95%   Breastfeeding No   BMI 26 93 kg/m²          Physical Exam  HENT:      Head: Normocephalic  Eyes:      Pupils: Pupils are equal, round, and reactive to light  Neck:      Musculoskeletal: Neck supple  Cardiovascular:      Rate and Rhythm: Normal rate and regular rhythm  Pulmonary:      Effort: Pulmonary effort is normal    Abdominal:      Palpations: Abdomen is soft  Musculoskeletal: Normal range of motion  Skin:     General: Skin is warm and dry  Neurological:      Mental Status: She is alert  Psychiatric:         Behavior: Behavior normal          BMI Counseling: Body mass index is 26 93 kg/m²  The BMI is above normal  Nutrition recommendations include consuming healthier snacks, decreasing soda and/or juice intake, moderation in carbohydrate intake and increasing intake of lean protein  Exercise recommendations include exercising 3-5 times per week

## 2021-04-22 ENCOUNTER — IMMUNIZATIONS (OUTPATIENT)
Dept: FAMILY MEDICINE CLINIC | Facility: HOSPITAL | Age: 61
End: 2021-04-22

## 2021-04-22 DIAGNOSIS — Z23 ENCOUNTER FOR IMMUNIZATION: Primary | ICD-10-CM

## 2021-04-22 PROCEDURE — 91301 SARS-COV-2 / COVID-19 MRNA VACCINE (MODERNA) 100 MCG: CPT

## 2021-04-22 PROCEDURE — 0012A SARS-COV-2 / COVID-19 MRNA VACCINE (MODERNA) 100 MCG: CPT

## 2021-06-21 ENCOUNTER — OFFICE VISIT (OUTPATIENT)
Dept: FAMILY MEDICINE CLINIC | Facility: CLINIC | Age: 61
End: 2021-06-21
Payer: COMMERCIAL

## 2021-06-21 VITALS
SYSTOLIC BLOOD PRESSURE: 110 MMHG | HEIGHT: 63 IN | HEART RATE: 84 BPM | OXYGEN SATURATION: 98 % | RESPIRATION RATE: 16 BRPM | TEMPERATURE: 97.9 F | BODY MASS INDEX: 26.4 KG/M2 | DIASTOLIC BLOOD PRESSURE: 70 MMHG | WEIGHT: 149 LBS

## 2021-06-21 DIAGNOSIS — Z12.31 ENCOUNTER FOR SCREENING MAMMOGRAM FOR MALIGNANT NEOPLASM OF BREAST: Primary | ICD-10-CM

## 2021-06-21 DIAGNOSIS — M54.50 CHRONIC BILATERAL LOW BACK PAIN WITHOUT SCIATICA: ICD-10-CM

## 2021-06-21 DIAGNOSIS — G89.29 CHRONIC BILATERAL LOW BACK PAIN WITHOUT SCIATICA: ICD-10-CM

## 2021-06-21 PROCEDURE — 99213 OFFICE O/P EST LOW 20 MIN: CPT | Performed by: FAMILY MEDICINE

## 2021-06-21 RX ORDER — BACLOFEN 20 MG/1
20 TABLET ORAL 2 TIMES DAILY
Qty: 30 TABLET | Refills: 0 | Status: SHIPPED | OUTPATIENT
Start: 2021-06-21 | End: 2022-02-15 | Stop reason: ALTCHOICE

## 2021-06-25 ENCOUNTER — HOSPITAL ENCOUNTER (OUTPATIENT)
Dept: RADIOLOGY | Facility: HOSPITAL | Age: 61
Discharge: HOME | End: 2021-06-25
Payer: COMMERCIAL

## 2021-06-25 DIAGNOSIS — M54.50 CHRONIC BILATERAL LOW BACK PAIN WITHOUT SCIATICA: ICD-10-CM

## 2021-06-25 DIAGNOSIS — G89.29 CHRONIC BILATERAL LOW BACK PAIN WITHOUT SCIATICA: ICD-10-CM

## 2021-06-25 PROCEDURE — 72110 X-RAY EXAM L-2 SPINE 4/>VWS: CPT

## 2021-07-01 DIAGNOSIS — G89.4 CHRONIC PAIN DISORDER: ICD-10-CM

## 2021-07-01 RX ORDER — VENLAFAXINE HYDROCHLORIDE 75 MG/1
CAPSULE, EXTENDED RELEASE ORAL
Qty: 90 CAPSULE | Refills: 3 | Status: SHIPPED | OUTPATIENT
Start: 2021-07-01 | End: 2022-01-27 | Stop reason: SDUPTHER

## 2021-07-06 DIAGNOSIS — G89.29 CHRONIC BILATERAL LOW BACK PAIN WITHOUT SCIATICA: Primary | ICD-10-CM

## 2021-07-06 DIAGNOSIS — M54.50 CHRONIC BILATERAL LOW BACK PAIN WITHOUT SCIATICA: ICD-10-CM

## 2021-07-06 DIAGNOSIS — G89.29 CHRONIC MIDLINE LOW BACK PAIN WITHOUT SCIATICA: Primary | ICD-10-CM

## 2021-07-06 DIAGNOSIS — M54.50 CHRONIC BILATERAL LOW BACK PAIN WITHOUT SCIATICA: Primary | ICD-10-CM

## 2021-07-06 DIAGNOSIS — G89.29 CHRONIC BILATERAL LOW BACK PAIN WITHOUT SCIATICA: ICD-10-CM

## 2021-07-06 DIAGNOSIS — M54.50 CHRONIC MIDLINE LOW BACK PAIN WITHOUT SCIATICA: Primary | ICD-10-CM

## 2021-07-06 RX ORDER — BACLOFEN 20 MG/1
20 TABLET ORAL 2 TIMES DAILY
Qty: 30 TABLET | Refills: 0 | OUTPATIENT
Start: 2021-07-06

## 2021-07-13 ENCOUNTER — TELEPHONE (OUTPATIENT)
Dept: FAMILY MEDICINE CLINIC | Facility: CLINIC | Age: 61
End: 2021-07-13

## 2021-07-13 NOTE — RESULT ENCOUNTER NOTE
Please call patient about Normal lumbar xray, then likely muscle problem  Keep exercise and stretching  Try yoga on youtube

## 2021-07-13 NOTE — TELEPHONE ENCOUNTER
----- Message from Cristian Suarez MD sent at 7/13/2021  5:01 AM EDT -----  Please call patient about Normal lumbar xray, then likely muscle problem  Keep exercise and stretching  Try yoga on youtube

## 2021-07-16 ENCOUNTER — TELEPHONE (OUTPATIENT)
Dept: PHYSICAL THERAPY | Facility: OTHER | Age: 61
End: 2021-07-16

## 2021-09-30 NOTE — TELEPHONE ENCOUNTER
Psychotherapy Group Note    PATIENT'S NAME: Joel Pineda  MRN:   [id number]  :   1974  ACCT. NUMBER: [id number]  DATE OF SERVICE: 21  START TIME:  3:00 PM  END TIME:  3:50 PM  FACILITATOR: Morenita Lau LPCC  TOPIC: MH EBP Group: Coping Skills  [facility] Adult [facility] Health Day Treatment  TRACK: 4B    NUMBER OF PARTICIPANTS: 8    Summary of Group / Topics Discussed:  Coping Skills: Improve the Moment: Patients learned to tolerate distress, by applying strategies to effect positive change in the present moment.  Patients will identified situations where they would benefit from applying strategies to improve the moment and reduce distress. Patients discussed how to distinguish when this can be useful in their lives or when other strategies would be more relevant or helpful.    Patient Session Goals / Objectives:    Discuss how the use of intentional  in the moment  actions can help reduce distress.    Review patients current practices and discuss a more formal way of practicing and accessing skills.    Increase ability to decide when to use improve the moment strategies    Choose 1-2 in the moment actions to apply during times of distress.                                      Service Modality:  Video Visit     Telemedicine Visit: The patient's condition can be safely assessed and treated via synchronous audio and visual telemedicine encounter.      Reason for Telemedicine Visit: Services only offered telehealth    Originating Site (Patient Location): Patient's place of employment    Distant Site (Provider Location): Provider Remote Setting- Home Office    Consent:  The patient/guardian has verbally consented to: the potential risks and benefits of telemedicine (video visit) versus in person care; bill my insurance or make self-payment for services provided; and responsibility for payment of non-covered services.     Patient would like the video invitation sent by:  My Chart    Mode of  Voice mail/message left requesting patient to return call to [place] [address] program including our hours of business and phone number  Kindly asked to LM with Full Name,  and Reminded CB may come from a non- number as the nurses are working remotely/off-site      Referral closed Communication:  Video Conference via Medical Zoom    As the provider I attest to compliance with applicable laws and regulations related to telemedicine.            Patient Participation / Response:  Fully participated with the group by sharing personal reflections / insights and openly received / provided feedback with other participants.    Demonstrated understanding of topics discussed through group discussion and participation, Expressed understanding of the relevance / importance of coping skills at distressing times in life and Demonstrated knowledge of when to consider using a variety of coping skills in daily life    Treatment Plan:  Patient has a current master individualized treatment plan.  See Epic treatment plan for more information.    Morenita Lau, [facility]

## 2022-01-27 DIAGNOSIS — E78.2 MIXED HYPERLIPIDEMIA: ICD-10-CM

## 2022-01-27 DIAGNOSIS — E03.9 HYPOTHYROIDISM, UNSPECIFIED TYPE: Primary | ICD-10-CM

## 2022-01-27 DIAGNOSIS — R09.82 PND (POST-NASAL DRIP): ICD-10-CM

## 2022-01-27 DIAGNOSIS — K21.9 LARYNGOPHARYNGEAL REFLUX (LPR): ICD-10-CM

## 2022-01-27 DIAGNOSIS — G89.4 CHRONIC PAIN DISORDER: ICD-10-CM

## 2022-01-27 RX ORDER — VENLAFAXINE HYDROCHLORIDE 75 MG/1
75 CAPSULE, EXTENDED RELEASE ORAL DAILY
Qty: 90 CAPSULE | Refills: 3 | Status: SHIPPED | OUTPATIENT
Start: 2022-01-27

## 2022-01-27 RX ORDER — LEVOTHYROXINE SODIUM 137 UG/1
137 TABLET ORAL
Qty: 90 TABLET | Refills: 4 | Status: SHIPPED | OUTPATIENT
Start: 2022-01-27

## 2022-01-27 RX ORDER — PANTOPRAZOLE SODIUM 40 MG/1
40 TABLET, DELAYED RELEASE ORAL EVERY MORNING
Qty: 90 TABLET | Refills: 4 | Status: SHIPPED | OUTPATIENT
Start: 2022-01-27

## 2022-01-27 RX ORDER — ROSUVASTATIN CALCIUM 5 MG/1
5 TABLET, COATED ORAL DAILY
Qty: 90 TABLET | Refills: 3 | Status: SHIPPED | OUTPATIENT
Start: 2022-01-27 | End: 2023-01-27

## 2022-02-02 ENCOUNTER — HOSPITAL ENCOUNTER (OUTPATIENT)
Dept: MAMMOGRAPHY | Facility: CLINIC | Age: 62
Discharge: HOME | End: 2022-02-02
Payer: COMMERCIAL

## 2022-02-02 DIAGNOSIS — Z12.31 ENCOUNTER FOR SCREENING MAMMOGRAM FOR MALIGNANT NEOPLASM OF BREAST: ICD-10-CM

## 2022-02-02 PROCEDURE — 77067 SCR MAMMO BI INCL CAD: CPT

## 2022-02-02 PROCEDURE — 77063 BREAST TOMOSYNTHESIS BI: CPT

## 2022-02-15 ENCOUNTER — OFFICE VISIT (OUTPATIENT)
Dept: FAMILY MEDICINE CLINIC | Facility: CLINIC | Age: 62
End: 2022-02-15
Payer: COMMERCIAL

## 2022-02-15 VITALS
BODY MASS INDEX: 26.22 KG/M2 | TEMPERATURE: 97.8 F | WEIGHT: 148 LBS | HEART RATE: 68 BPM | SYSTOLIC BLOOD PRESSURE: 130 MMHG | OXYGEN SATURATION: 96 % | RESPIRATION RATE: 16 BRPM | HEIGHT: 63 IN | DIASTOLIC BLOOD PRESSURE: 70 MMHG

## 2022-02-15 DIAGNOSIS — E78.2 MIXED HYPERLIPIDEMIA: ICD-10-CM

## 2022-02-15 DIAGNOSIS — M25.562 ACUTE PAIN OF LEFT KNEE: Primary | ICD-10-CM

## 2022-02-15 DIAGNOSIS — E03.9 HYPOTHYROIDISM, UNSPECIFIED TYPE: ICD-10-CM

## 2022-02-15 PROCEDURE — 3008F BODY MASS INDEX DOCD: CPT | Performed by: FAMILY MEDICINE

## 2022-02-15 PROCEDURE — 99214 OFFICE O/P EST MOD 30 MIN: CPT | Performed by: FAMILY MEDICINE

## 2022-02-15 PROCEDURE — 3725F SCREEN DEPRESSION PERFORMED: CPT | Performed by: FAMILY MEDICINE

## 2022-02-15 NOTE — PROGRESS NOTES
Assessment/Plan:  Acute pain of the left knee  Recommended to check x-ray to rule out to arthritis  Hypothyroidism  He will continue same treatment with levothyroxine and check TSH not done for the past one year  Recommended patient to continue care with Endocrinology  Mixed hyperlipidemia  Repeat lipid profile, exercise, decrease amount of fat in diet  BMI of 26 22  The BMI is above normal  Nutrition recommendations include reducing portion sizes, decreasing overall calorie intake, 3-5 servings of fruits/vegetables daily, reducing fast food intake, consuming healthier snacks, decreasing soda and/or juice intake, moderation in carbohydrate intake, increasing intake of lean protein, reducing intake of saturated fat and trans fat, and reducing intake of cholesterol  Exercise recommendations include moderate aerobic physical activity for 150 minutes/week, vigorous aerobic physical activity for 75 minutes/week if possible, the most usual recommendation is exercising 3-5 times per week, joining a gym is a reasonable option with the precaution of the current situation, and strength training exercises are always important as we the as well  No problem-specific Assessment & Plan notes found for this encounter  Diagnoses and all orders for this visit:    Acute pain of left knee  -     Lipid panel; Future  -     XR knee 3 vw left non injury; Future    Hypothyroidism, unspecified type  -     TSH, 3rd generation; Future  -     Lipid panel; Future    Mixed hyperlipidemia  -     Lipid panel; Future    BMI 26 0-26 9,adult  -     Lipid panel; Future          Subjective:      Patient ID: Salvador Kinney is a 60 y o  female  HPI   [facility] complaining of   Chronic pain in Left knee; started about 12 the week(s) ago  The pain is  continuous and has been gradually worsening since onset  The quality of the pain is described as aching  The pain does not radiate  The pain is at a severity of 5/10    The symptoms are aggravated by movement mainly walking  Associated symptoms include numbness, paresthesias and tingling  She already  tried NSAID,s products  This is not  a work related issue  The following portions of the patient's history were reviewed and updated as appropriate: allergies, current medications, past family history, past medical history, past social history, past surgical history and problem list     Review of Systems   Constitutional: Negative for diaphoresis, fatigue, fever and unexpected weight change  Respiratory: Negative for cough, chest tightness, shortness of breath and wheezing  Cardiovascular: Negative for chest pain, palpitations and leg swelling  Gastrointestinal: Negative for abdominal pain, blood in stool and constipation  Musculoskeletal: Positive for arthralgias (Left knee) and neck pain  Neurological: Negative for dizziness, syncope, light-headedness and headaches  Hematological: Does not bruise/bleed easily  Psychiatric/Behavioral: Negative for behavioral problems, self-injury and sleep disturbance  The patient is not nervous/anxious  Objective:      /70 (BP Location: Left arm, Patient Position: Sitting, Cuff Size: Standard)   Pulse 68   Temp 97 8 °F (36 6 °C) (Temporal)   Resp 16   Ht 5' 3" (1 6 m)   Wt 67 1 kg (148 lb)   SpO2 96%   Breastfeeding No   BMI 26 22 kg/m²          Physical Exam  Constitutional:       Appearance: Normal appearance  Musculoskeletal:         General: Tenderness (Over the knee is more intense over the medial area of the left knee  The knee is stable to different maneuvers to assess ligaments ) present  BMI Counseling: Body mass index is 26 22 kg/m²  The BMI is above normal  Nutrition recommendations include consuming healthier snacks, decreasing soda and/or juice intake and moderation in carbohydrate intake  Exercise recommendations include exercising 3-5 times per week

## 2022-04-01 ENCOUNTER — HOSPITAL ENCOUNTER (OUTPATIENT)
Dept: RADIOLOGY | Facility: HOSPITAL | Age: 62
Discharge: HOME | End: 2022-04-01
Payer: COMMERCIAL

## 2022-04-01 DIAGNOSIS — M25.562 ACUTE PAIN OF LEFT KNEE: ICD-10-CM

## 2022-04-01 PROCEDURE — 73562 X-RAY EXAM OF KNEE 3: CPT

## 2022-04-09 LAB
CHOLEST SERPL-MCNC: 155 MG/DL
CHOLEST/HDLC SERPL: 2.5 (CALC)
HDLC SERPL-MCNC: 63 MG/DL
LDLC SERPL CALC-MCNC: 79 MG/DL (CALC)
NONHDLC SERPL-MCNC: 92 MG/DL (CALC)
TRIGL SERPL-MCNC: 58 MG/DL
TSH SERPL-ACNC: 1.98 MIU/L (ref 0.4–4.5)

## 2022-04-15 ENCOUNTER — TELEPHONE (OUTPATIENT)
Dept: FAMILY MEDICINE CLINIC | Facility: CLINIC | Age: 62
End: 2022-04-15

## 2022-05-09 ENCOUNTER — OFFICE VISIT (OUTPATIENT)
Dept: FAMILY MEDICINE CLINIC | Facility: CLINIC | Age: 62
End: 2022-05-09
Payer: COMMERCIAL

## 2022-05-09 VITALS
HEART RATE: 68 BPM | DIASTOLIC BLOOD PRESSURE: 70 MMHG | WEIGHT: 145 LBS | TEMPERATURE: 97.9 F | OXYGEN SATURATION: 99 % | SYSTOLIC BLOOD PRESSURE: 120 MMHG | BODY MASS INDEX: 25.69 KG/M2 | RESPIRATION RATE: 16 BRPM | HEIGHT: 63 IN

## 2022-05-09 DIAGNOSIS — Z00.01 ENCOUNTER FOR GENERAL ADULT MEDICAL EXAMINATION WITH ABNORMAL FINDINGS: Primary | ICD-10-CM

## 2022-05-09 DIAGNOSIS — M25.50 ARTHRALGIA, UNSPECIFIED JOINT: ICD-10-CM

## 2022-05-09 DIAGNOSIS — E55.9 VITAMIN D DEFICIENCY: ICD-10-CM

## 2022-05-09 PROCEDURE — 3008F BODY MASS INDEX DOCD: CPT | Performed by: NURSE PRACTITIONER

## 2022-05-09 PROCEDURE — 99396 PREV VISIT EST AGE 40-64: CPT | Performed by: FAMILY MEDICINE

## 2022-05-09 NOTE — PROGRESS NOTES
Assessment/Plan:    No problem-specific Assessment & Plan notes found for this encounter  Diagnoses and all orders for this visit:    Encounter for general adult medical examination with abnormal findings  -     CBC and differential; Future  -     Comprehensive metabolic panel; Future    Vitamin D deficiency  -     Vitamin D 25 hydroxy; Future          Subjective:      Patient ID: Karen Rodriguez is a 60 y o  female  HPI  Patient is here for PE, not having any acute distress  The following portions of the patient's history were reviewed and updated as appropriate: allergies, current medications, past family history, past medical history, past social history, past surgical history and problem list     Review of Systems   Constitutional: Negative for diaphoresis, fatigue, fever and unexpected weight change  Respiratory: Negative for cough, chest tightness, shortness of breath and wheezing  Cardiovascular: Negative for chest pain, palpitations and leg swelling  Gastrointestinal: Negative for abdominal pain, blood in stool and constipation  Neurological: Negative for dizziness, syncope, light-headedness and headaches  Hematological: Does not bruise/bleed easily  Psychiatric/Behavioral: Negative for behavioral problems, self-injury and sleep disturbance  The patient is not nervous/anxious  Objective:      /70 (BP Location: Left arm, Patient Position: Sitting, Cuff Size: Standard)   Pulse 68   Temp 97 9 °F (36 6 °C) (Temporal)   Resp 16   Ht 5' 3" (1 6 m)   Wt 65 8 kg (145 lb)   SpO2 99%   Breastfeeding No   BMI 25 69 kg/m²          Physical Exam  Cardiovascular:      Rate and Rhythm: Normal rate and regular rhythm  Pulmonary:      Effort: Pulmonary effort is normal       Breath sounds: Normal breath sounds  Musculoskeletal:         General: Normal range of motion  Cervical back: Neck supple  Neurological:      General: No focal deficit present        Mental Status: She is alert and oriented to person, place, and time     Psychiatric:         Behavior: Behavior normal

## 2022-05-14 LAB
25(OH)D3 SERPL-MCNC: 133 NG/ML (ref 30–100)
ALBUMIN SERPL-MCNC: 4.2 G/DL (ref 3.6–5.1)
ALBUMIN/GLOB SERPL: 1.9 (CALC) (ref 1–2.5)
ALP SERPL-CCNC: 88 U/L (ref 37–153)
ALT SERPL-CCNC: 22 U/L (ref 6–29)
AST SERPL-CCNC: 27 U/L (ref 10–35)
BASOPHILS # BLD AUTO: 29 CELLS/UL (ref 0–200)
BASOPHILS NFR BLD AUTO: 0.6 %
BILIRUB SERPL-MCNC: 0.6 MG/DL (ref 0.2–1.2)
BUN SERPL-MCNC: 21 MG/DL (ref 7–25)
BUN/CREAT SERPL: NORMAL (CALC) (ref 6–22)
CALCIUM SERPL-MCNC: 9 MG/DL (ref 8.6–10.4)
CHLORIDE SERPL-SCNC: 105 MMOL/L (ref 98–110)
CO2 SERPL-SCNC: 31 MMOL/L (ref 20–32)
CREAT SERPL-MCNC: 0.54 MG/DL (ref 0.5–0.99)
CRP SERPL-MCNC: 0.8 MG/L
EOSINOPHIL # BLD AUTO: 59 CELLS/UL (ref 15–500)
EOSINOPHIL NFR BLD AUTO: 1.2 %
ERYTHROCYTE [DISTWIDTH] IN BLOOD BY AUTOMATED COUNT: 12.5 % (ref 11–15)
GLOBULIN SER CALC-MCNC: 2.2 G/DL (CALC) (ref 1.9–3.7)
GLUCOSE SERPL-MCNC: 81 MG/DL (ref 65–99)
HCT VFR BLD AUTO: 35 % (ref 35–45)
HGB BLD-MCNC: 11.6 G/DL (ref 11.7–15.5)
LYMPHOCYTES # BLD AUTO: 1789 CELLS/UL (ref 850–3900)
LYMPHOCYTES NFR BLD AUTO: 36.5 %
MCH RBC QN AUTO: 31.4 PG (ref 27–33)
MCHC RBC AUTO-ENTMCNC: 33.1 G/DL (ref 32–36)
MCV RBC AUTO: 94.9 FL (ref 80–100)
MONOCYTES # BLD AUTO: 407 CELLS/UL (ref 200–950)
MONOCYTES NFR BLD AUTO: 8.3 %
NEUTROPHILS # BLD AUTO: 2617 CELLS/UL (ref 1500–7800)
NEUTROPHILS NFR BLD AUTO: 53.4 %
PLATELET # BLD AUTO: 200 THOUSAND/UL (ref 140–400)
PMV BLD REES-ECKER: 13.3 FL (ref 7.5–12.5)
POTASSIUM SERPL-SCNC: 4.5 MMOL/L (ref 3.5–5.3)
PROT SERPL-MCNC: 6.4 G/DL (ref 6.1–8.1)
RBC # BLD AUTO: 3.69 MILLION/UL (ref 3.8–5.1)
SL AMB EGFR AFRICAN AMERICAN: 116 ML/MIN/1.73M2
SL AMB EGFR NON AFRICAN AMERICAN: 100 ML/MIN/1.73M2
SODIUM SERPL-SCNC: 141 MMOL/L (ref 135–146)
WBC # BLD AUTO: 4.9 THOUSAND/UL (ref 3.8–10.8)

## 2022-05-19 ENCOUNTER — TELEMEDICINE (OUTPATIENT)
Dept: FAMILY MEDICINE CLINIC | Facility: CLINIC | Age: 62
End: 2022-05-19
Payer: COMMERCIAL

## 2022-05-19 DIAGNOSIS — R06.02 SHORTNESS OF BREATH: ICD-10-CM

## 2022-05-19 DIAGNOSIS — R05.9 COUGH: ICD-10-CM

## 2022-05-19 DIAGNOSIS — U07.1 COVID-19 VIRUS INFECTION: Primary | ICD-10-CM

## 2022-05-19 PROCEDURE — 99214 OFFICE O/P EST MOD 30 MIN: CPT | Performed by: NURSE PRACTITIONER

## 2022-05-19 RX ORDER — BENZONATATE 200 MG/1
200 CAPSULE ORAL 3 TIMES DAILY PRN
Qty: 20 CAPSULE | Refills: 0 | Status: SHIPPED | OUTPATIENT
Start: 2022-05-19

## 2022-05-19 RX ORDER — ALBUTEROL SULFATE 90 UG/1
2 AEROSOL, METERED RESPIRATORY (INHALATION) EVERY 6 HOURS PRN
Qty: 18 G | Refills: 0 | Status: SHIPPED | OUTPATIENT
Start: 2022-05-19

## 2022-05-19 NOTE — LETTER
May 19, 2022     Patient: Renzo Bond  YOB: 1960  Date of Visit: 5/19/2022      To Whom it May Concern:    Renzo Bond is under my professional care  She was seen in my office on 5/19/22  She is vaccinated and had COVID 19 infection on 5/18/22, she may return to work as of 5/24/22, as this is 5 days from the onset of symptoms and no fevers in 24 hours  Upon return, they must then adhere to strict masking for an additional 5 days per CDC guidelines  If you have any questions or concerns please reach out to me           Sincerely,          NUNU Allen        CC: No Recipients

## 2022-05-19 NOTE — ASSESSMENT & PLAN NOTE
-reports chest tightness and shortness of breath at times  I am prescribing an inhaler to use as needed  If s/s do not improve or worsen to go to nearest hospital  Pt verbalized understanding

## 2022-05-19 NOTE — ASSESSMENT & PLAN NOTE
I advised patient that they should isolate for 5 days from onset of symptoms and stay in their home and have contact with as few people as possible  They should stay home unless medically necessary  They  should not attend school/work with other people or public gatherings  A medical letter will be provided to patient for time off  Employers have been advised to be considerate if an employee needs to be quarantined  Patient was recommended not to attend gatherings, meetings or areas where people come together  They also should not have any visitors while on quarantine to avoid spread if suspicion of COVID  Patient should monitor their symptoms and take their temperature everyday and report any fever or new symptoms to PCP  Patient can take OTC medications as patient has like Tylenol and practice conservative measures while at home in quarantine  Stressed importance of good hand hygiene practices and avoid sharing any personal items with other household members  Practicing good disinfection as well  Pt in agreement  Patient is Day 1 from symptom onset 5/18/22  Pt symptoms are not changed  Admits to fevers today  Offered oral anti-virals to patient at this time she refused  Offered MAB IV infusion as well and patient does not wish to have it  States if she worsens she will notify us  She is aware of the 5 day timeframe  Pt was advised that as long as symptoms are improving per CDC guidelines she can return to work on 5/24/22, as it has been 5 days from symptom onset and as long as no fevers in 24 hours  Pt in agreement with plan  If any changes in symptoms or new symptoms occur to inform office

## 2022-06-07 DIAGNOSIS — D50.0 IRON DEFICIENCY ANEMIA DUE TO CHRONIC BLOOD LOSS: Primary | ICD-10-CM

## 2022-06-07 NOTE — RESULT ENCOUNTER NOTE
Please call patient  Stop vitamin D, it is high  Anemia is present  Repeat labs to recheck anemia  Order in system

## 2022-08-29 DIAGNOSIS — K59.04 CHRONIC IDIOPATHIC CONSTIPATION: Primary | ICD-10-CM

## 2022-08-29 RX ORDER — LINACLOTIDE 290 UG/1
1 CAPSULE, GELATIN COATED ORAL DAILY
Qty: 30 CAPSULE | Refills: 5 | Status: SHIPPED | OUTPATIENT
Start: 2022-08-29

## 2022-10-11 PROBLEM — R05.9 COUGH: Status: RESOLVED | Noted: 2022-05-19 | Resolved: 2022-10-11

## 2022-11-09 ENCOUNTER — RA CDI HCC (OUTPATIENT)
Dept: OTHER | Facility: HOSPITAL | Age: 62
End: 2022-11-09

## 2022-11-09 NOTE — PROGRESS NOTES
[facility]  coding opportunities       Chart reviewed, no opportunity found: CHART REVIEWED, NO OPPORTUNITY FOUND        Patients Insurance        Commercial Insurance: [employer]

## 2022-11-14 ENCOUNTER — OFFICE VISIT (OUTPATIENT)
Dept: FAMILY MEDICINE CLINIC | Facility: CLINIC | Age: 62
End: 2022-11-14

## 2022-11-14 VITALS
OXYGEN SATURATION: 98 % | RESPIRATION RATE: 16 BRPM | WEIGHT: 153 LBS | BODY MASS INDEX: 27.11 KG/M2 | TEMPERATURE: 98 F | HEIGHT: 63 IN | DIASTOLIC BLOOD PRESSURE: 70 MMHG | SYSTOLIC BLOOD PRESSURE: 110 MMHG | HEART RATE: 80 BPM

## 2022-11-14 DIAGNOSIS — M85.89 OSTEOPENIA OF MULTIPLE SITES: ICD-10-CM

## 2022-11-14 DIAGNOSIS — E03.9 PRIMARY HYPOTHYROIDISM: Primary | ICD-10-CM

## 2022-11-14 DIAGNOSIS — Z12.31 BREAST CANCER SCREENING BY MAMMOGRAM: ICD-10-CM

## 2022-11-14 PROBLEM — Z87.898 HISTORY OF PALPITATIONS: Status: RESOLVED | Noted: 2018-12-12 | Resolved: 2022-11-14

## 2022-11-14 PROBLEM — J32.0 CHRONIC MAXILLARY SINUSITIS: Status: RESOLVED | Noted: 2019-01-08 | Resolved: 2022-11-14

## 2022-11-14 PROBLEM — R06.02 SHORTNESS OF BREATH: Status: RESOLVED | Noted: 2022-05-19 | Resolved: 2022-11-14

## 2022-11-14 PROBLEM — U07.1 COVID-19 VIRUS INFECTION: Status: RESOLVED | Noted: 2022-05-19 | Resolved: 2022-11-14

## 2022-11-14 NOTE — PROGRESS NOTES
Name: Soraya Jane      : 1960      MRN: [id number]  Encounter Provider: Cecille Cronin MD  Encounter Date: 2022   Encounter department: [facility]     Explained to patient for pain she can use Tylenol as needed  Reassurance  Possible osteoarthritis  Hypothyroidism all: Continue same dose of levothyroxine, labs reviewed, continue follow-up with Dr Jay Shultz also  1  Primary hypothyroidism    2  Breast cancer screening by mammogram  -     Mammo screening bilateral w 3d & cad; Future; Expected date: 2023    3  Osteopenia of multiple sites  -     DXA bone density spine hip and pelvis; Future; Expected date: 2022           Subjective      HPI   Soraya Jane 60 y o  complaining of    Chronic pain in both upper extremities, she works 10 hours a day in a [employer]  She is due for mammogram and bone density scan  Review of Systems   Constitutional: Positive for fatigue  Respiratory: Negative for shortness of breath  Cardiovascular: Negative for chest pain, palpitations and leg swelling  Gastrointestinal: Positive for constipation  Endocrine: Negative  Genitourinary: Negative  Musculoskeletal: Positive for arthralgias, back pain, myalgias and neck stiffness  Skin: Negative  Neurological: Positive for dizziness, weakness and numbness  Hematological: Negative  Psychiatric/Behavioral: Positive for sleep disturbance  Negative for suicidal ideas  The patient is nervous/anxious          Current Outpatient Medications on File Prior to Visit   Medication Sig   • linaCLOtide (Linzess) 290 MCG CAPS Take 1 capsule by mouth in the morning   • albuterol (PROVENTIL HFA,VENTOLIN HFA) 90 mcg/act inhaler Inhale 2 puffs every 6 (six) hours as needed for shortness of breath   • Ergocalciferol (VITAMIN D2 PO) Take 50,000 Units by mouth once a week   • levothyroxine 137 mcg tablet Take 1 tablet (137 mcg total) by mouth daily in the early morning   • pantoprazole (PROTONIX) 40 mg tablet Take 1 tablet (40 mg total) by mouth every morning 30 minutes before breakfast    • venlafaxine (EFFEXOR-XR) 75 mg 24 hr capsule Take 1 capsule (75 mg total) by mouth daily   • [DISCONTINUED] benzonatate (TESSALON) 200 MG capsule Take 1 capsule (200 mg total) by mouth as needed in the morning and 1 capsule (200 mg total) as needed at noon and 1 capsule (200 mg total) as needed in the evening for cough  • [DISCONTINUED] rosuvastatin (CRESTOR) 5 mg tablet Take 1 tablet (5 mg total) by mouth daily       Objective     /70 (BP Location: Left arm, Patient Position: Sitting, Cuff Size: Standard)   Pulse 80   Temp 98 °F (36 7 °C) (Temporal)   Resp 16   Ht 5' 3" (1 6 m)   Wt 69 4 kg (153 lb)   SpO2 98%   Breastfeeding No   BMI 27 10 kg/m²     Physical Exam  Cardiovascular:      Rate and Rhythm: Normal rate and regular rhythm  Pulmonary:      Effort: Pulmonary effort is normal       Breath sounds: Normal breath sounds  Musculoskeletal:         General: Normal range of motion  Cervical back: Neck supple  Neurological:      General: No focal deficit present  Mental Status: She is alert and oriented to person, place, and time     Psychiatric:         Behavior: Behavior normal        Braxton Phillips MD

## 2022-12-12 ENCOUNTER — OFFICE VISIT (OUTPATIENT)
Dept: CARDIOLOGY CLINIC | Facility: CLINIC | Age: 62
End: 2022-12-12

## 2022-12-12 VITALS
HEART RATE: 67 BPM | WEIGHT: 153.4 LBS | DIASTOLIC BLOOD PRESSURE: 64 MMHG | SYSTOLIC BLOOD PRESSURE: 122 MMHG | BODY MASS INDEX: 27.18 KG/M2 | HEIGHT: 63 IN

## 2022-12-12 DIAGNOSIS — R00.2 PALPITATIONS: Primary | ICD-10-CM

## 2022-12-12 DIAGNOSIS — E78.2 MIXED HYPERLIPIDEMIA: ICD-10-CM

## 2022-12-12 NOTE — PATIENT INSTRUCTIONS
CARDIOLOGY ASSESSMENT & PLAN     1  Palpitations  POCT ECG      2  Mixed hyperlipidemia  POCT ECG        Palpitations  Ms Leighton Belle is overall doing well with no recent significant symptoms  She reports mild palpitations in the setting of anxiety usually  Her exercise tolerance is good  Physical examination is overall unremarkable  I did not appreciate any significant murmur on exam   Her ECG has benign abnormality  Although the LA interval was short she gives no history of feeling of sustained palpitations or of passing out or near passing out  Her lipids are reasonably well controlled  She is not on any antihypertensive medications and her blood pressure is normal     -- At this time I am advising her to continue normal activities and maintain healthy weight  -- she does not  need to follow with cardiology on a regular basis  She should have at least yearly lipids checked along with her thyroid function  She is advised to follow-up with primary care physician regularly and reach out to us if she develops any concerning symptoms such as increasing shortness of breath decreasing exercise tolerance and ability to lie on a flat surface due to shortness of breath or any palpitations or any passing out or near passing out episodes

## 2022-12-12 NOTE — PROGRESS NOTES
[facility]  [address] [facility], [place] [address]  Phone#  [phone]  Fax#  [phone]  *-*-*-*-*-*-*-*-*-*-*-*-*-*-*-*-*-*-*-*-*-*-*-*-*-*-*-*-*-*-*-*-*-*-*-*-*-*-*-*-*-*-*-*-*-*-*-*-*-*-*-*-*-*  ENCOUNTER DATE: 12/12/22 10:41 AM  PATIENT NAME: Kushal James   1960    [id number]  AGE:62 y o  SEX: female  Denise Garland MD     PRIMARY CARE PHYSICIAN: Alba Charlton MD    DIAGNOSES:  1  Benign essential hypertension,  Currently blood pressure controlled without any medications  2  Mixed dyslipidemia  3  Hypothyroidism  4  History of depression  5  Asthma  6  GERD     Echocardiogram in September 2017 showed normal left ventricular size and systolic function, grade 1 diastolic dysfunction, no significant chamber hypertrophy or enlargement, aortic valve sclerosis, trace mitral and trace to mild tricuspid  Valve regurgitation, no pulmonary hypertension no pericardial effusion  Exercise treadmill stress test in September 2017 demonstrated good exercise capacity, no ECG changes of ischemia at 90% maximal predicted heart rate  Normal resting blood pressure with appropriate blood pressure response to exercise  Cardiac catheterization in 2012 showed angiographically normal coronary arteries and normal left ventricular function  CURRENT ECG     Results for orders placed or performed in visit on 12/12/22   POCT ECG    Narrative    Sinus rhythm, HR 67 bpm, normal axis, short TN interval, nonspecific ST-T wave abnormalities  No significant chamber hypertrophy or enlargement  No evidence of prior infarction  CARDIOLOGY ASSESSMENT & PLAN     1  Palpitations  POCT ECG      2  Mixed hyperlipidemia  POCT ECG        Palpitations  Ms Kushal James is overall doing well with no recent significant symptoms  She reports mild palpitations in the setting of anxiety usually  Her exercise tolerance is good  Physical examination is overall unremarkable    I did not appreciate any significant murmur on exam   Her ECG has benign abnormality  Although the [place] interval was short she gives no history of feeling of sustained palpitations or of passing out or near passing out  Her lipids are reasonably well controlled  She is not on any antihypertensive medications and her blood pressure is normal     -- At this time I am advising her to continue normal activities and maintain healthy weight  -- she does not  need to follow with cardiology on a regular basis  She should have at least yearly lipids checked along with her thyroid function  She is advised to follow-up with primary care physician regularly and reach out to us if she develops any concerning symptoms such as increasing shortness of breath decreasing exercise tolerance and ability to lie on a flat surface due to shortness of breath or any palpitations or any passing out or near passing out episodes  INTERVAL HISTORY & HISTORY OF PRESENT ILLNESS     Kushal James is here for follow-up regarding her cardiac comorbidities which include: History of palpitations, chest pains, primary hypertension and other comorbidities  Patient was previously seen by me in December 2018  She is here for follow-up accompanied with her  Angelica Moon  Patient reports that she has had no new diagnoses or hospitalizations or other illnesses since I saw her in 2018  She reports that her previously reported palpitations are better and occur only when she is stressed or anxious about something  She remains active and works full-time where her job requires her to stand on her feet all day  She has not experienced decline in her exercise tolerance  She denies any unusual chest pain or shortness of breath or palpitations or passing out or near passing out episodes  Functional capacity status: Good   (Excellent- >10 METs; Good: (7-10 METs); Moderate (4-7 METs);  Poor (<= 4 METs)    Any chronic stressors: None   (feeling of poor health, financial problems, and social isolation etc)  Tobacco or alcohol dependence: She does not smoke  Occasional alcohol use  Current cardiac medications: Occasionally she is not on any cardiac specific medications  Last blood work is from May 2022  Sodium was 141 potassium 4 5 chloride 105 bicarb 31 BUN 21 creatinine 0 54, her LFTs were normal, total cholesterol was 155 triglyceride 58 HDL of 63 calculated LDL 79 CBC was normal last TSH was 1 98 last A1c was 6 0 in 2019    REVIEW OF SYSTEMS   Positive for: As noted above in HPI  Negative for: All remaining as reviewed below and in HPI  SYSTEM SYMPTOMS REVIEWED:  General--weight change, fever, night sweats  Respiratory--cough, wheezing, shortness of breath, sputum production  Cardiovascular--chest pain, syncope, dyspnea on exertion, edema, decline in exercise tolerance, claudication   Gastrointestinal--persistent vomiting, diarrhea, abdominal distention, blood in stool   Muscular or skeletal--joint pain or swelling   Neurologic--headaches, syncope, abnormal movement  Hematologic--history of easy bruising and bleeding   Endocrine--thyroid enlargement, heat or cold intolerance, polyuria   Psychiatric--anxiety, depression     *-*-*-*-*-*-*-*-*-*-*-*-*-*-*-*-*-*-*-*-*-*-*-*-*-*-*-*-*-*-*-*-*-*-*-*-*-*-*-*-*-*-*-*-*-*-*-*-*-*-*-*-*-*-  VITAL SIGNS     CURRENT VITAL SIGNS:   Vitals:    12/12/22 1018   BP: 122/64   Pulse: 67   Weight: 69 6 kg (153 lb 6 4 oz)   Height: 5' 3" (1 6 m)       BMI: Body mass index is 27 17 kg/m²      WEIGHTS:   Wt Readings from Last 25 Encounters:   12/12/22 69 6 kg (153 lb 6 4 oz)   11/14/22 69 4 kg (153 lb)   05/09/22 65 8 kg (145 lb)   02/15/22 67 1 kg (148 lb)   06/21/21 67 6 kg (149 lb)   05/26/21 68 9 kg (152 lb)   04/28/21 68 9 kg (152 lb)   04/01/21 68 9 kg (152 lb)   10/01/20 65 3 kg (144 lb)   07/01/20 83 9 kg (185 lb)   04/01/20 65 8 kg (145 lb)   03/13/19 66 7 kg (147 lb)   02/01/19 66 7 kg (147 lb)   01/08/19 66 7 kg (147 lb)   12/12/18 65 8 kg (145 lb)        *-*-*-*-*-*-*-*-*-*-*-*-*-*-*-*-*-*-*-*-*-*-*-*-*-*-*-*-*-*-*-*-*-*-*-*-*-*-*-*-*-*-*-*-*-*-*-*-*-*-*-*-*-*-  PHYSICAL EXAM     General Appearance:    Alert, cooperative, no distress, appears stated age   Head, Eyes, ENT:    No obvious abnormality, moist mucous mebranes  Neck:   Supple, no carotid bruit or JVD   Back:     Symmetric, no curvature  Lungs:     Respirations unlabored  Clear to auscultation bilaterally,    Chest wall:    No tenderness or deformity   Heart:    Regular rate and rhythm, S1 and S2 normal, no murmur, rub  or gallop  Abdomen:     Soft, non-tender, No obvious masses, or organomegaly   Extremities:   Extremities warm, no cyanosis or edema    Skin:   No venostatic changes in lower extremities  Normal skin color, texture, and turgor   No rashes or lesions     *-*-*-*-*-*-*-*-*-*-*-*-*-*-*-*-*-*-*-*-*-*-*-*-*-*-*-*-*-*-*-*-*-*-*-*-*-*-*-*-*-*-*-*-*-*-*-*-*-*-*-*-*-*-  CURRENT MEDICATIONS LIST     Current Outpatient Medications:   •  Ergocalciferol (VITAMIN D2 PO), Take 50,000 Units by mouth once a week, Disp: , Rfl:   •  levothyroxine 137 mcg tablet, Take 1 tablet (137 mcg total) by mouth daily in the early morning, Disp: 90 tablet, Rfl: 4  •  linaCLOtide (Linzess) 290 MCG CAPS, Take 1 capsule by mouth in the morning, Disp: 30 capsule, Rfl: 5  •  venlafaxine (EFFEXOR-XR) 75 mg 24 hr capsule, Take 1 capsule (75 mg total) by mouth daily, Disp: 90 capsule, Rfl: 3  •  albuterol (PROVENTIL HFA,VENTOLIN HFA) 90 mcg/act inhaler, Inhale 2 puffs every 6 (six) hours as needed for shortness of breath, Disp: 18 g, Rfl: 0  •  pantoprazole (PROTONIX) 40 mg tablet, Take 1 tablet (40 mg total) by mouth every morning 30 minutes before breakfast , Disp: 90 tablet, Rfl: 4       ALLERGIES     Allergies   Allergen Reactions   • Pollen Extract Cough *-*-*-*-*-*-*-*-*-*-*-*-*-*-*-*-*-*-*-*-*-*-*-*-*-*-*-*-*-*-*-*-*-*-*-*-*-*-*-*-*-*-*-*-*-*-*-*-*-*-*-*-*-*-  LABORATORY DATA   No results found for: NA  Potassium   Date Value Ref Range Status   05/13/2022 4 5 3 5 - 5 3 mmol/L Final   03/13/2019 4 2 3 6 - 5 0 mmol/L Final   07/27/2018 4 7 3 5 - 5 3 mmol/L Final   01/31/2017 4 7 3 5 - 5 3 mmol/L Final     Chloride   Date Value Ref Range Status   05/13/2022 105 98 - 110 mmol/L Final   03/13/2019 103 97 - 108 mmol/L Final   07/27/2018 106 98 - 110 mmol/L Final   01/31/2017 106 98 - 110 mmol/L Final     CO2   Date Value Ref Range Status   05/13/2022 31 20 - 32 mmol/L Final   03/13/2019 29 22 - 30 mmol/L Final   07/27/2018 29 20 - 31 mmol/L Final   01/31/2017 23 20 - 31 mmol/L Final     BUN   Date Value Ref Range Status   05/13/2022 21 7 - 25 mg/dL Final   03/13/2019 16 5 - 25 mg/dL Final   07/27/2018 20 7 - 25 mg/dL Final   01/31/2017 17 7 - 25 mg/dL Final     Creatinine   Date Value Ref Range Status   05/13/2022 0 54 0 50 - 0 99 mg/dL Final     Comment:     For patients >52years of age, the reference limit  for Creatinine is approximately 13% higher for people  identified as -American         03/13/2019 0 53 (L) 0 60 - 1 20 mg/dL Final     Comment:     Standardized to IDMS reference method   07/27/2018 0 73 0 50 - 1 05 mg/dL Final     Comment:     For patients >52years of age, the reference limit  for Creatinine is approximately 13% higher for people  identified as -American         01/31/2017 0 68 0 50 - 1 05 mg/dL Final     Comment:     For patients >52years of age, the reference limit  for Creatinine is approximately 13% higher for people  identified as -American            eGFR   Date Value Ref Range Status   03/13/2019 104 >60 ml/min/1 73sq m Final     Calcium   Date Value Ref Range Status   05/13/2022 9 0 8 6 - 10 4 mg/dL Final   03/13/2019 9 5 8 4 - 10 2 mg/dL Final   07/27/2018 9 2 8 6 - 10 4 mg/dL Final   01/31/2017 9 2 8 6 - 10 4 mg/dL Final     AST   Date Value Ref Range Status   05/13/2022 27 10 - 35 U/L Final   03/13/2019 26 14 - 36 U/L Final     Comment:       Specimen collection should occur prior to Sulfasalazine administration due to the potential for falsely depressed results  07/27/2018 12 10 - 35 U/L Final   01/31/2017 18 10 - 35 U/L Final     ALT   Date Value Ref Range Status   05/13/2022 22 6 - 29 U/L Final   03/13/2019 28 9 - 52 U/L Final     Comment:       Specimen collection should occur prior to Sulfasalazine administration due to the potential for falsely depressed results  07/27/2018 10 6 - 29 U/L Final   01/31/2017 11 6 - 29 U/L Final     Alkaline Phosphatase   Date Value Ref Range Status   05/13/2022 88 37 - 153 U/L Final   03/13/2019 91 43 - 122 U/L Final   07/27/2018 99 33 - 130 U/L Final   01/31/2017 109 33 - 130 U/L Final     WBC   Date Value Ref Range Status   03/13/2019 6 20 4 50 - 11 00 Thousand/uL Final     White Blood Cell Count   Date Value Ref Range Status   05/13/2022 4 9 3 8 - 10 8 Thousand/uL Final   07/27/2018 5 1 3 8 - 10 8 Thousand/uL Final   01/31/2017 5 6 3 8 - 10 8 Thousand/uL Final     Hemoglobin   Date Value Ref Range Status   05/13/2022 11 6 (L) 11 7 - 15 5 g/dL Final   03/13/2019 12 9 12 0 - 16 0 g/dL Final   07/27/2018 12 0 11 7 - 15 5 g/dL Final   01/31/2017 12 9 11 7 - 15 5 g/dL Final     Platelet Count   Date Value Ref Range Status   05/13/2022 200 140 - 400 Thousand/uL Final   07/27/2018 218 140 - 400 Thousand/uL Final   01/31/2017 223 140 - 400 Thousand/uL Final     Platelets   Date Value Ref Range Status   03/13/2019 213 150 - 450 Thousands/uL Final     No results found for: PT, PTT, INR  No results found for: CKMB, DIGOXIN  TSH   Date Value Ref Range Status   04/08/2022 1 98 0 40 - 4 50 mIU/L Final   07/27/2018 3 76 0 40 - 4 50 mIU/L Final     No results found for: CHOL   Hgb A1c   Date Value Ref Range Status   01/23/2021 5 4 <5 7 % of total Hgb Final     Comment:      For the purpose of screening for the presence of  diabetes:    <5 7%       Consistent with the absence of diabetes  5 7-6 4%    Consistent with increased risk for diabetes              (prediabetes)  > or =6 5%  Consistent with diabetes    This assay result is consistent with a decreased risk  of diabetes  Currently, no consensus exists regarding use of  hemoglobin A1c for diagnosis of diabetes in children  According to American Diabetes Association (ADA)  guidelines, hemoglobin A1c <7 0% represents optimal  control in non-pregnant diabetic patients  Different  metrics may apply to specific patient populations  Standards of Medical Care in Diabetes(ADA)          No results found for: Hawkandres Montez, GRAMSTAIN, URINECX, WOUNDCULT, BODYFLUIDCUL, MRSACULTURE, INFLUAPCR, INFLUBPCR, RSVPCR, LEGIONELLAUR, CDIFFTOXINB    *-*-*-*-*-*-*-*-*-*-*-*-*-*-*-*-*-*-*-*-*-*-*-*-*-*-*-*-*-*-*-*-*-*-*-*-*-*-*-*-*-*-*-*-*-*-*-*-*-*-*-*-*-*-  PREVIOUS CARDIOLOGY & RADIOLOGY TEST RESULTS   I have personally reviewed pertinent results of cardiovascular tests noted below  No results found for this or any previous visit  No results found for this or any previous visit  No results found for this or any previous visit  No results found for this or any previous visit  XR knee 3 vw left non injury  Narrative: LEFT KNEE    INDICATION:   [id number]: Pain in left knee  COMPARISON:  None    VIEWS:  XR KNEE 3 VW LEFT NON INJURY     FINDINGS:    There is no acute fracture or dislocation  There is no joint effusion  No significant degenerative changes  No lytic or blastic osseous lesion  Soft tissues are unremarkable  Impression: No acute osseous abnormality      Workstation performed: [id number]        *-*-*-*-*-*-*-*-*-*-*-*-*-*-*-*-*-*-*-*-*-*-*-*-*-*-*-*-*-*-*-*-*-*-*-*-*-*-*-*-*-*-*-*-*-*-*-*-*-*-*-*-*-*-  SIGNATURES:   [unfilled]   Willa Perez MD; MHA    *-*-*-*-*-*-*-*-*-*-*-*-*-*-*-*-*-*-*-*-*-*-*-*-*-*-*-*-*-*-*-*-*-*-*-*-*-*-*-*-*-*-*-*-*-*-*-*-*-*-*-*-*-*-  PAST MEDICAL HISTORY:  Past Medical History:   Diagnosis Date   • Asthma    • Disease of thyroid gland    • Gastritis    • GERD (gastroesophageal reflux disease)    • H  pylori infection 09/2017   • High cholesterol    • Osteopenia    • Thyroid disease     PAST SURGICAL HISTORY:  Past Surgical History:   Procedure Laterality Date   • AUGMENTATION MAMMAPLASTY Bilateral 6774    silicone   • BREAST BIOPSY Left 2015    normal   • BREAST CYST EXCISION Bilateral    • CHOLECYSTECTOMY     • COLONOSCOPY W/ POLYPECTOMY  09/2017   • ESOPHAGOGASTRODUODENOSCOPY  09/14/2017    non erosive gastritis, positive h pylori   • HYSTERECTOMY  1991   • OOPHORECTOMY  1991         FAMILY HISTORY:  Family History   Problem Relation Age of Onset   • Heart attack Mother 61   • No Known Problems Sister    • No Known Problems Sister    • No Known Problems Sister    • No Known Problems Maternal Aunt    • No Known Problems Maternal Aunt    • No Known Problems Maternal Aunt    • No Known Problems Maternal Aunt    • Stomach cancer Paternal Aunt    • No Known Problems Paternal Aunt    • No Known Problems Paternal Aunt     SOCIAL HISTORY:  Social History     Tobacco Use   Smoking Status Never   Smokeless Tobacco Never      Social History     Substance and Sexual Activity   Alcohol Use Yes   • Alcohol/week: 2 0 standard drinks   • Types: 2 Glasses of wine per week     Social History     Substance and Sexual Activity   Drug Use No    [id number]     *-*-*-*-*-*-*-*-*-*-*-*-*-*-*-*-*-*-*-*-*-*-*-*-*-*-*-*-*-*-*-*-*-*-*-*-*-*-*-*-*-*-*-*-*-*-*-*-*-*-*-*-*-*  ALLERGIES:  Allergies   Allergen Reactions   • Pollen Extract Cough    CURRENT SCHEDULED MEDICATIONS:    Current Outpatient Medications:   •  Ergocalciferol (VITAMIN D2 PO), Take 50,000 Units by mouth once a week, Disp: , Rfl:   •  levothyroxine 137 mcg tablet, Take 1 tablet (137 mcg total) by mouth daily in the early morning, Disp: 90 tablet, Rfl: 4  •  linaCLOtide (Linzess) 290 MCG CAPS, Take 1 capsule by mouth in the morning, Disp: 30 capsule, Rfl: 5  •  venlafaxine (EFFEXOR-XR) 75 mg 24 hr capsule, Take 1 capsule (75 mg total) by mouth daily, Disp: 90 capsule, Rfl: 3  •  albuterol (PROVENTIL HFA,VENTOLIN HFA) 90 mcg/act inhaler, Inhale 2 puffs every 6 (six) hours as needed for shortness of breath, Disp: 18 g, Rfl: 0  •  pantoprazole (PROTONIX) 40 mg tablet, Take 1 tablet (40 mg total) by mouth every morning 30 minutes before breakfast , Disp: 90 tablet, Rfl: 4     *-*-*-*-*-*-*-*-*-*-*-*-*-*-*-*-*-*-*-*-*-*-*-*-*-*-*-*-*-*-*-*-*-*-*-*-*-*-*-*-*-*-*-*-*-*-*-*-*-*-*-*-*-*

## 2022-12-12 NOTE — ASSESSMENT & PLAN NOTE
Ms Neda Cruz is overall doing well with no recent significant symptoms  She reports mild palpitations in the setting of anxiety usually  Her exercise tolerance is good  Physical examination is overall unremarkable  I did not appreciate any significant murmur on exam   Her ECG has benign abnormality  Although the [place] interval was short she gives no history of feeling of sustained palpitations or of passing out or near passing out  Her lipids are reasonably well controlled  She is not on any antihypertensive medications and her blood pressure is normal     -- At this time I am advising her to continue normal activities and maintain healthy weight  -- she does not  need to follow with cardiology on a regular basis  She should have at least yearly lipids checked along with her thyroid function  She is advised to follow-up with primary care physician regularly and reach out to us if she develops any concerning symptoms such as increasing shortness of breath decreasing exercise tolerance and ability to lie on a flat surface due to shortness of breath or any palpitations or any passing out or near passing out episodes

## 2023-01-11 DIAGNOSIS — G89.4 CHRONIC PAIN DISORDER: ICD-10-CM

## 2023-01-11 RX ORDER — VENLAFAXINE HYDROCHLORIDE 75 MG/1
CAPSULE, EXTENDED RELEASE ORAL
Qty: 90 CAPSULE | Refills: 3 | Status: SHIPPED | OUTPATIENT
Start: 2023-01-11

## 2023-02-15 ENCOUNTER — HOSPITAL ENCOUNTER (OUTPATIENT)
Dept: MAMMOGRAPHY | Facility: CLINIC | Age: 63
Discharge: HOME | End: 2023-02-15

## 2023-02-15 ENCOUNTER — HOSPITAL ENCOUNTER (OUTPATIENT)
Dept: BONE DENSITY | Facility: CLINIC | Age: 63
Discharge: HOME | End: 2023-02-15

## 2023-02-15 VITALS — HEIGHT: 63 IN | WEIGHT: 153 LBS | BODY MASS INDEX: 27.11 KG/M2

## 2023-02-15 DIAGNOSIS — Z12.31 VISIT FOR SCREENING MAMMOGRAM: ICD-10-CM

## 2023-02-15 DIAGNOSIS — Z12.31 BREAST CANCER SCREENING BY MAMMOGRAM: ICD-10-CM

## 2023-02-15 DIAGNOSIS — Z13.820 ENCOUNTER FOR SCREENING FOR OSTEOPOROSIS: ICD-10-CM

## 2023-02-15 DIAGNOSIS — M85.89 OSTEOPENIA OF MULTIPLE SITES: ICD-10-CM

## 2023-02-17 NOTE — RESULT ENCOUNTER NOTE
Dear Rian Cueva you have osteopenia as per her DXA scan  Osteopenia is decrease in bone density  Lifestyle measures include adequate calcium, vitamin D, exercise, counseling on fall prevention  1200 mg of elemental calcium daily, and 800 international units of vitamin D daily are advised  Kiley Gray

## 2023-02-20 NOTE — RESULT ENCOUNTER NOTE
Dear Kari Lemon:   After having your routine mammogram the following recommendations were given:       - Additional imaging is needed         - Diagnostic mammogram for the left breast        - Ultrasound left breast   Due to asymmetry seen in the upper region of the left breast

## 2023-04-23 DIAGNOSIS — G89.4 CHRONIC PAIN DISORDER: ICD-10-CM

## 2023-04-25 RX ORDER — VENLAFAXINE HYDROCHLORIDE 75 MG/1
75 CAPSULE, EXTENDED RELEASE ORAL DAILY
Qty: 90 CAPSULE | Refills: 3 | Status: SHIPPED | OUTPATIENT
Start: 2023-04-25

## 2023-08-28 ENCOUNTER — RA CDI HCC (OUTPATIENT)
Dept: OTHER | Facility: HOSPITAL | Age: 63
End: 2023-08-28

## 2023-08-28 NOTE — PROGRESS NOTES
[address] [facility] coding opportunities       Chart reviewed, no opportunity found: CHART REVIEWED, NO OPPORTUNITY FOUND        Patients Insurance        Commercial Insurance: [employer]

## 2023-09-05 ENCOUNTER — OFFICE VISIT (OUTPATIENT)
Dept: FAMILY MEDICINE CLINIC | Facility: CLINIC | Age: 63
End: 2023-09-05
Payer: COMMERCIAL

## 2023-09-05 VITALS
SYSTOLIC BLOOD PRESSURE: 130 MMHG | HEIGHT: 63 IN | TEMPERATURE: 98 F | RESPIRATION RATE: 16 BRPM | OXYGEN SATURATION: 99 % | BODY MASS INDEX: 27.46 KG/M2 | HEART RATE: 72 BPM | DIASTOLIC BLOOD PRESSURE: 70 MMHG | WEIGHT: 155 LBS

## 2023-09-05 DIAGNOSIS — R10.13 EPIGASTRIC ABDOMINAL PAIN: ICD-10-CM

## 2023-09-05 DIAGNOSIS — Z00.01 ENCOUNTER FOR GENERAL ADULT MEDICAL EXAMINATION WITH ABNORMAL FINDINGS: Primary | ICD-10-CM

## 2023-09-05 DIAGNOSIS — F41.9 ANXIETY: ICD-10-CM

## 2023-09-05 DIAGNOSIS — E03.9 PRIMARY HYPOTHYROIDISM: ICD-10-CM

## 2023-09-05 PROCEDURE — 99396 PREV VISIT EST AGE 40-64: CPT | Performed by: FAMILY MEDICINE

## 2023-09-05 RX ORDER — VENLAFAXINE HYDROCHLORIDE 150 MG/1
150 CAPSULE, EXTENDED RELEASE ORAL DAILY
Qty: 90 CAPSULE | Refills: 3 | Status: SHIPPED | OUTPATIENT
Start: 2023-09-05

## 2023-09-05 RX ORDER — ROSUVASTATIN CALCIUM 5 MG/1
TABLET, COATED ORAL
COMMUNITY
Start: 2023-06-06

## 2023-09-05 NOTE — PROGRESS NOTES
Name: Ramiro Myers      : 1960      MRN: [id number]  Encounter Provider: Lyly Washington MD  Encounter Date: 2023   Encounter department: [facility] [address] [place] [address] [address] is here for a physical exam. I found her without any distress. The patient has the following chronic conditions   Patient Active Problem List   Diagnosis   • Mixed hyperlipidemia   • Encounter for general adult medical examination with abnormal findings   • Abnormal mammogram   • IFG (impaired fasting glucose)   • Osteopenia of lumbar spine   • Primary hypothyroidism   • Vitamin D deficiency   • Palpitations   • Epigastric abdominal pain   • Anxiety   • BMI 27.0-27.9,adult   . I recommended exercising at least 3 1/2  hours per week and maintaining a healthy diet with low carbohydrates and saturated fat. I gaveher  information about lifestyle modification. The patient understands the need for an annual physical exam.      1. Encounter for general adult medical examination with abnormal findings    2. BMI 27.0-27.9,adult    3. Anxiety  -     venlafaxine (EFFEXOR-XR) 150 mg 24 hr capsule; Take 1 capsule (150 mg total) by mouth daily    4. Primary hypothyroidism  -     TSH, 3rd generation; Future    5. Epigastric abdominal pain  -     H. pylori antigen, stool; Future         . BMI Counseling: Body mass index is 27.46 kg/m². The BMI is above normal. Nutrition recommendations include reducing portion sizes. Subjective      Patient is here for PE, not having any acute distress. Review of Systems   Constitutional: Positive for fatigue. Respiratory: Negative for shortness of breath. Cardiovascular: Negative for chest pain, palpitations and leg swelling. Gastrointestinal: Positive for constipation. Endocrine: Negative. Genitourinary: Negative. Musculoskeletal: Positive for arthralgias, back pain, myalgias and neck stiffness. Skin: Negative.     Neurological: Positive for dizziness, weakness and numbness. Hematological: Negative. Psychiatric/Behavioral: Positive for sleep disturbance. Negative for suicidal ideas. The patient is nervous/anxious. Current Outpatient Medications on File Prior to Visit   Medication Sig   • Ergocalciferol (VITAMIN D2 PO) Take 50,000 Units by mouth once a week   • levothyroxine 137 mcg tablet Take 1 tablet (137 mcg total) by mouth daily in the early morning   • linaCLOtide (Linzess) 290 MCG CAPS Take 1 capsule by mouth in the morning   • rosuvastatin (CRESTOR) 5 mg tablet        Objective     /70 (BP Location: Left arm, Patient Position: Sitting, Cuff Size: Standard)   Pulse 72   Temp 98 °F (36.7 °C) (Tympanic)   Resp 16   Ht 5' 3" (1.6 m)   Wt 70.3 kg (155 lb)   SpO2 99%   BMI 27.46 kg/m²     Physical Exam  Cardiovascular:      Rate and Rhythm: Normal rate and regular rhythm. Pulmonary:      Effort: Pulmonary effort is normal.      Breath sounds: Normal breath sounds. Musculoskeletal:         General: Normal range of motion. Cervical back: Neck supple. Neurological:      General: No focal deficit present. Mental Status: She is alert and oriented to person, place, and time.    Psychiatric:         Behavior: Behavior normal.       Jaime Rachel MD

## 2023-09-11 ENCOUNTER — LAB (OUTPATIENT)
Dept: LAB | Facility: HOSPITAL | Age: 63
End: 2023-09-11
Payer: COMMERCIAL

## 2023-09-11 DIAGNOSIS — R10.13 EPIGASTRIC ABDOMINAL PAIN: ICD-10-CM

## 2023-09-11 DIAGNOSIS — E03.9 PRIMARY HYPOTHYROIDISM: ICD-10-CM

## 2023-09-11 PROBLEM — F41.9 ANXIETY: Status: ACTIVE | Noted: 2023-09-11

## 2023-09-11 PROBLEM — Z00.01 ENCOUNTER FOR GENERAL ADULT MEDICAL EXAMINATION WITH ABNORMAL FINDINGS: Status: ACTIVE | Noted: 2019-01-08

## 2023-09-11 LAB — TSH SERPL DL<=0.05 MIU/L-ACNC: 3.67 UIU/ML (ref 0.45–4.5)

## 2023-09-11 PROCEDURE — 36415 COLL VENOUS BLD VENIPUNCTURE: CPT

## 2023-09-11 PROCEDURE — 84443 ASSAY THYROID STIM HORMONE: CPT

## 2023-09-11 PROCEDURE — 87338 HPYLORI STOOL AG IA: CPT

## 2023-09-12 LAB — H PYLORI AG STL QL IA: NEGATIVE

## 2023-12-28 NOTE — PROGRESS NOTES
Assessment/Plan:  I recommended the patient to follow up with physical therapy to improve muscle strength of the lower back and work in her gait to prevent from falling and trauma  USE TOPICAL NSAID LIKE VOLTAREN AND THAT SHE HAS AND MUSCLE RELAXANT B I D  BUT IF SHE WORDS USE A BEDTIME ONLY  No problem-specific Assessment & Plan notes found for this encounter  Diagnoses and all orders for this visit:    Encounter for screening mammogram for malignant neoplasm of breast  -     Mammo screening bilateral w 3d & cad; Future    Chronic bilateral low back pain without sciatica  -     XR spine lumbar 2 or 3 views injury; Future  -     baclofen 20 mg tablet; Take 1 tablet (20 mg total) by mouth 2 (two) times a day          Subjective:      Patient ID: Neda Cruz is a 57 y o  female  HPI  Neda Cruz 57 y o  complaining of   Acute pain in bilaterally; started about 3 week(s) ago  The pain is continuos and has been gradually worsening since onset  The quality of the pain is described as aching  The pain radiates is heaviness  The pain is at a severity of 5/10  The symptoms are aggravated by movement  Associated symptoms include numbness, paresthesias and tingling  She had tried multiple alternatives without improved her symptoms  The following portions of the patient's history were reviewed and updated as appropriate: allergies, current medications, past family history, past medical history, past social history, past surgical history and problem list     Review of Systems   Constitutional: Negative for diaphoresis, fatigue, fever and unexpected weight change  Respiratory: Negative for cough, chest tightness, shortness of breath and wheezing  Cardiovascular: Negative for chest pain, palpitations and leg swelling  Gastrointestinal: Negative for abdominal pain, blood in stool and constipation  Musculoskeletal: Positive for back pain     Neurological: Negative for dizziness, syncope, light-headedness and headaches  Hematological: Does not bruise/bleed easily  Psychiatric/Behavioral: Negative for behavioral problems, self-injury and sleep disturbance  The patient is not nervous/anxious  Objective:      /70 (BP Location: Left arm, Patient Position: Sitting, Cuff Size: Standard)   Pulse 84   Temp 97 9 °F (36 6 °C) (Temporal)   Resp 16   Ht 5' 3" (1 6 m)   Wt 67 6 kg (149 lb)   SpO2 98%   Breastfeeding No   BMI 26 39 kg/m²          Physical Exam  HENT:      Head: Normocephalic  Eyes:      Pupils: Pupils are equal, round, and reactive to light  Cardiovascular:      Rate and Rhythm: Normal rate and regular rhythm  Pulmonary:      Effort: Pulmonary effort is normal    Abdominal:      Palpations: Abdomen is soft  Musculoskeletal:         General: Tenderness present  Cervical back: Neck supple  Skin:     General: Skin is warm and dry  Neurological:      Mental Status: She is alert  Green (Altered Mental Status/Behavior)

## 2024-01-17 ENCOUNTER — OFFICE VISIT (OUTPATIENT)
Dept: FAMILY MEDICINE CLINIC | Facility: CLINIC | Age: 64
End: 2024-01-17
Payer: COMMERCIAL

## 2024-01-17 VITALS
RESPIRATION RATE: 16 BRPM | DIASTOLIC BLOOD PRESSURE: 70 MMHG | BODY MASS INDEX: 27.82 KG/M2 | OXYGEN SATURATION: 98 % | HEIGHT: 63 IN | TEMPERATURE: 97.9 F | HEART RATE: 72 BPM | SYSTOLIC BLOOD PRESSURE: 120 MMHG | WEIGHT: 157 LBS

## 2024-01-17 DIAGNOSIS — R10.13 EPIGASTRIC PAIN: Primary | ICD-10-CM

## 2024-01-17 DIAGNOSIS — Z12.31 BREAST CANCER SCREENING BY MAMMOGRAM: ICD-10-CM

## 2024-01-17 DIAGNOSIS — E03.9 HYPOTHYROIDISM, UNSPECIFIED TYPE: ICD-10-CM

## 2024-01-17 DIAGNOSIS — E78.2 MIXED HYPERLIPIDEMIA: ICD-10-CM

## 2024-01-17 DIAGNOSIS — K59.04 CHRONIC IDIOPATHIC CONSTIPATION: ICD-10-CM

## 2024-01-17 PROCEDURE — 99214 OFFICE O/P EST MOD 30 MIN: CPT | Performed by: FAMILY MEDICINE

## 2024-01-17 RX ORDER — LINACLOTIDE 290 UG/1
290 CAPSULE, GELATIN COATED ORAL DAILY
Qty: 90 CAPSULE | Refills: 3 | Status: SHIPPED | OUTPATIENT
Start: 2024-01-17 | End: 2025-01-11

## 2024-01-17 RX ORDER — LEVOTHYROXINE SODIUM 137 UG/1
137 TABLET ORAL
Qty: 90 TABLET | Refills: 4 | Status: SHIPPED | OUTPATIENT
Start: 2024-01-17

## 2024-01-17 RX ORDER — ROSUVASTATIN CALCIUM 5 MG/1
5 TABLET, COATED ORAL DAILY
Qty: 90 TABLET | Refills: 3 | Status: SHIPPED | OUTPATIENT
Start: 2024-01-17

## 2024-01-17 RX ORDER — ROSUVASTATIN CALCIUM 5 MG/1
20 TABLET, COATED ORAL DAILY
Qty: 90 TABLET | Refills: 3 | Status: SHIPPED | OUTPATIENT
Start: 2024-01-17 | End: 2024-01-17 | Stop reason: SDUPTHER

## 2024-01-17 RX ORDER — PANTOPRAZOLE SODIUM 20 MG/1
20 TABLET, DELAYED RELEASE ORAL
Qty: 30 TABLET | Refills: 0 | Status: SHIPPED | OUTPATIENT
Start: 2024-01-17 | End: 2024-07-15

## 2024-01-17 NOTE — PROGRESS NOTES
Name: Lourdes Jeong      : 1960      MRN: [id number]  Encounter Provider: Jose Armando Nazario MD  Encounter Date: 2024   Encounter department: [facility] CARE [facility]    Assessment & Plan   Abdominal pain and gastrointestinal symptoms were suggestive of Irritable Bowel Syndrome (IBS) less likely gastrointestinal infection. Continue pantoprazole  address possible gastritis or GERD symptoms. The patient was advised to try increased dose of Linzess.  Constipation and cessation of Linzess (linaclotide) were discussed with the patient. The importance of medication adherence was emphasized and options for insurance coverage or alternative therapies were explored. If Linzess was not an option, other treatments for constipation were considered.  High vitamin D levels were previously noted. The patient’s current supplementation regimen was reviewed and adjusted as necessary to maintain optimal levels.  Diarrhea, intermittent in nature, was reported. The patient was advised to maintain a food diary to identify any potential dietary triggers. If diarrhea persisted, stool studies were considered to rule out infectious causes.  Mammography was overdue. An order for the screening test was provided and the patient was ensured that regular breast cancer screening was important.  Follow-up on thyroid medication was recommended and the patient was ensured that taking it correctly was important, as thyroid dysfunction could contribute to gastrointestinal symptoms.  1. Breast cancer screening by mammogram  -     Mammo screening bilateral w 3d & cad; Future; Expected date: 2024    2. Hypothyroidism, unspecified type  -     levothyroxine 137 mcg tablet; Take 1 tablet (137 mcg total) by mouth daily in the early morning    3. Chronic idiopathic constipation  -     linaCLOtide (Linzess) 290 MCG CAPS; Take 1 capsule by mouth in the morning    4. Mixed hyperlipidemia  -     rosuvastatin (CRESTOR) 5  "mg tablet; Take 1 tablet (5 mg total) by mouth daily    5. Epigastric pain  -     pantoprazole (PROTONIX) 20 mg tablet; Take 1 tablet (20 mg total) by mouth daily before breakfast      JOHN Freed, a 63-year-old female, presents with abdominal pain, intermittent diarrhea, and a sensation of bloating. She reports the pain is colicky in nature and not associated with food intake. She also experiences nausea without vomiting and occasional heartburn. The patient has a history of constipation. She denies any recent changes in diet or medication, except for the cessation of Linzess due to insurance coverage issues. She has not taken Linzess recently. The patient also mentions she has been prescribed vitamin D supplementation due to previously high levels.    Vital Signs  /70 (BP Location: Left arm, Patient Position: Sitting, Cuff Size: Standard)   Pulse 72   Temp 97.9 °F (36.6 °C) (Tympanic)   Resp 16   Ht 5' 3\" (1.6 m)   Wt 71.2 kg (157 lb)   SpO2 98%   BMI 27.81 kg/m²       Physical Exam  She looks in non distress; oreinted Person, Place, and Day, HEENT unremarkable.  Lungs are clear. Heart is having Normal rhythm w/o murmurs.  Abdomen is soft, upper tender, non acute abdomen. MS: Muscle strength and tone were normal.  Neurological system has no deficit and walks normal.            Answers submitted by the patient for this visit:  Abdominal Pain Questionnaire (Submitted on 1/11/2024)  Chief Complaint: Abdominal pain  Chronicity: recurrent  Onset: 1 to 4 weeks ago  Onset quality: gradual  Frequency: constantly  Episode duration: 2 Hours  Progression since onset: waxing and waning  Pain location: generalized abdominal region  Pain - numeric: 6/10  Pain quality: cramping  Radiates to: periumbilical region  anorexia: No  arthralgias: Yes  belching: Yes  constipation: Yes  diarrhea: No  dysuria: No  fever: No  flatus: Yes  frequency: No  headaches: No  hematochezia: No  hematuria: No  melena: " No  myalgias: Yes  nausea: Yes  weight loss: No  vomiting: No  Aggravated by: nothing  Relieved by: eating, liquids  Diagnostic workup: lower endoscopy  Answered reviewed.      Jose Armando Nazario MD   [facility]

## 2024-02-14 DIAGNOSIS — R10.13 EPIGASTRIC PAIN: ICD-10-CM

## 2024-02-14 RX ORDER — PANTOPRAZOLE SODIUM 20 MG/1
TABLET, DELAYED RELEASE ORAL
Qty: 90 TABLET | Refills: 1 | Status: SHIPPED | OUTPATIENT
Start: 2024-02-14

## 2024-03-14 ENCOUNTER — OFFICE VISIT (OUTPATIENT)
Dept: FAMILY MEDICINE CLINIC | Facility: CLINIC | Age: 64
End: 2024-03-14
Payer: COMMERCIAL

## 2024-03-14 VITALS
WEIGHT: 156.4 LBS | HEIGHT: 63 IN | DIASTOLIC BLOOD PRESSURE: 74 MMHG | HEART RATE: 76 BPM | TEMPERATURE: 98.1 F | RESPIRATION RATE: 16 BRPM | BODY MASS INDEX: 27.71 KG/M2 | OXYGEN SATURATION: 98 % | SYSTOLIC BLOOD PRESSURE: 128 MMHG

## 2024-03-14 DIAGNOSIS — R30.9 PAINFUL URINATION: Primary | ICD-10-CM

## 2024-03-14 DIAGNOSIS — E55.9 VITAMIN D DEFICIENCY: ICD-10-CM

## 2024-03-14 DIAGNOSIS — E03.9 PRIMARY HYPOTHYROIDISM: ICD-10-CM

## 2024-03-14 PROBLEM — T45.2X1A: Status: RESOLVED | Noted: 2023-10-05 | Resolved: 2024-03-14

## 2024-03-14 PROBLEM — R73.01 IFG (IMPAIRED FASTING GLUCOSE): Status: RESOLVED | Noted: 2020-10-14 | Resolved: 2024-03-14

## 2024-03-14 PROBLEM — R10.13 EPIGASTRIC ABDOMINAL PAIN: Status: RESOLVED | Noted: 2023-09-11 | Resolved: 2024-03-14

## 2024-03-14 PROBLEM — Z83.3 FAMILY HISTORY OF DIABETES MELLITUS TYPE II: Status: ACTIVE | Noted: 2023-10-05

## 2024-03-14 PROBLEM — Z86.39 HISTORY OF ELEVATED GLUCOSE: Status: RESOLVED | Noted: 2023-03-07 | Resolved: 2024-03-14

## 2024-03-14 PROBLEM — N95.1 MENOPAUSAL STATE: Status: RESOLVED | Noted: 2023-10-05 | Resolved: 2024-03-14

## 2024-03-14 LAB
BACTERIA UR QL AUTO: ABNORMAL /HPF
BILIRUB UR QL STRIP: NEGATIVE
CLARITY UR: ABNORMAL
COLOR UR: YELLOW
GLUCOSE UR STRIP-MCNC: NEGATIVE MG/DL
HGB UR QL STRIP.AUTO: ABNORMAL
KETONES UR STRIP-MCNC: NEGATIVE MG/DL
LEUKOCYTE ESTERASE UR QL STRIP: ABNORMAL
MUCOUS THREADS UR QL AUTO: ABNORMAL
NITRITE UR QL STRIP: POSITIVE
NON-SQ EPI CELLS URNS QL MICRO: ABNORMAL /HPF
PH UR STRIP.AUTO: 5.5 [PH]
PROT UR STRIP-MCNC: ABNORMAL MG/DL
RBC #/AREA URNS AUTO: ABNORMAL /HPF
SL AMB  POCT GLUCOSE, UA: NEGATIVE
SL AMB LEUKOCYTE ESTERASE,UA: ABNORMAL
SL AMB POCT BILIRUBIN,UA: ABNORMAL
SL AMB POCT BLOOD,UA: ABNORMAL
SL AMB POCT CLARITY,UA: CLEAR
SL AMB POCT COLOR,UA: YELLOW
SL AMB POCT KETONES,UA: NEGATIVE
SL AMB POCT NITRITE,UA: POSITIVE
SL AMB POCT PH,UA: 5
SL AMB POCT SPECIFIC GRAVITY,UA: 1.03
SL AMB POCT URINE PROTEIN: ABNORMAL
SL AMB POCT UROBILINOGEN: NORMAL
SP GR UR STRIP.AUTO: 1.02 (ref 1–1.03)
UROBILINOGEN UR STRIP-ACNC: <2 MG/DL
WBC #/AREA URNS AUTO: ABNORMAL /HPF

## 2024-03-14 PROCEDURE — 87077 CULTURE AEROBIC IDENTIFY: CPT | Performed by: PHYSICIAN ASSISTANT

## 2024-03-14 PROCEDURE — 81001 URINALYSIS AUTO W/SCOPE: CPT | Performed by: PHYSICIAN ASSISTANT

## 2024-03-14 PROCEDURE — 99214 OFFICE O/P EST MOD 30 MIN: CPT | Performed by: PHYSICIAN ASSISTANT

## 2024-03-14 PROCEDURE — 87186 SC STD MICRODIL/AGAR DIL: CPT | Performed by: PHYSICIAN ASSISTANT

## 2024-03-14 PROCEDURE — 81002 URINALYSIS NONAUTO W/O SCOPE: CPT | Performed by: PHYSICIAN ASSISTANT

## 2024-03-14 PROCEDURE — 87086 URINE CULTURE/COLONY COUNT: CPT | Performed by: PHYSICIAN ASSISTANT

## 2024-03-14 RX ORDER — NITROFURANTOIN 25; 75 MG/1; MG/1
100 CAPSULE ORAL 2 TIMES DAILY
Qty: 10 CAPSULE | Refills: 0 | Status: SHIPPED | OUTPATIENT
Start: 2024-03-14 | End: 2024-03-19

## 2024-03-14 RX ORDER — ERGOCALCIFEROL 1.25 MG/1
50000 CAPSULE ORAL
COMMUNITY

## 2024-03-14 NOTE — ASSESSMENT & PLAN NOTE
3/2/24 10:50 AM    Vitamin D, 25-OH  30 - 100 ng/mL 82     Recently decreased dose vitamin D to 2x monthly instead of weekly due to overdosing. Continue same per Dr. Nazario.

## 2024-03-14 NOTE — PROGRESS NOTES
Name: Lourdes Jeong      : 1960      MRN: [id number]  Encounter Provider: Karol Chambers PA-C  Encounter Date: 3/14/2024   Encounter department: [facility]    Assessment & Plan     1. Painful urination  Comments:  with positive nitrite on urine dip, suspect UTI, start empiric macrobid x 5 days, recommend increase fluid intake  Orders:  -     POCT urine dip  -     nitrofurantoin (MACROBID) 100 mg capsule; Take 1 capsule (100 mg total) by mouth 2 (two) times a day for 5 days  -     UA w Reflex to Microscopic w Reflex to Culture; Future  -     UA w Reflex to Microscopic w Reflex to Culture    2. Primary hypothyroidism  Assessment & Plan:  Lab Results   Component Value Date    [id number] 3.668 2023    TSH 1.98 2022     Continue same dose levothyroxine.       3. Vitamin D deficiency  Assessment & Plan:  3/2/24 10:50 AM    Vitamin D, 25-OH  30 - 100 ng/mL 82     Recently decreased dose vitamin D to 2x monthly instead of weekly due to overdosing. Continue same per Dr. Nazario.              Subjective      Lourdes is a 64 y.o. female with a h/o hysterectomy, hypothyroidism who presents with painful urination x 1 week. Traveled to [facility] in middle of February for a week.  has appointment currently. Has not had UTI in a long time, sexually active with , no vaginal dryness. Still has one ovary. Hot flashes in her 50s. No smoking or ETOH use.    History was conducted in [place] without the use of .        Review of Systems   Constitutional:  Negative for chills, fever and unexpected weight change.   Respiratory:  Negative for shortness of breath.    Cardiovascular:  Negative for chest pain.   Genitourinary:  Positive for difficulty urinating, dysuria and frequency. Negative for flank pain and hematuria.       Current Outpatient Medications on File Prior to Visit   Medication Sig   • ergocalciferol (ERGOCALCIFEROL) 1.25 MG (26018 UT)  "capsule Take 50,000 Units by mouth every 14 (fourteen) days   • levothyroxine 137 mcg tablet Take 1 tablet (137 mcg total) by mouth daily in the early morning   • linaCLOtide (Linzess) 290 MCG CAPS Take 1 capsule by mouth in the morning   • pantoprazole (PROTONIX) 20 mg tablet TAKE 1 TABLET(20 MG) BY MOUTH DAILY BEFORE BREAKFAST   • rosuvastatin (CRESTOR) 5 mg tablet Take 1 tablet (5 mg total) by mouth daily   • venlafaxine (EFFEXOR-XR) 150 mg 24 hr capsule Take 1 capsule (150 mg total) by mouth daily       Objective     /74 (BP Location: Left arm, Patient Position: Sitting, Cuff Size: Standard)   Pulse 76   Temp 98.1 °F (36.7 °C) (Tympanic)   Resp 16   Ht 5' 3\" (1.6 m)   Wt 70.9 kg (156 lb 6.4 oz)   SpO2 98%   BMI 27.71 kg/m²     Physical Exam  Vitals and nursing note reviewed.   Constitutional:       General: She is not in acute distress.     Appearance: Normal appearance. She is not ill-appearing or diaphoretic.   Eyes:      General: No scleral icterus.  Cardiovascular:      Rate and Rhythm: Normal rate and regular rhythm.      Pulses: Normal pulses.   Pulmonary:      Effort: Pulmonary effort is normal.      Breath sounds: Normal breath sounds.   Abdominal:      Palpations: Abdomen is soft.      Tenderness: There is no right CVA tenderness, left CVA tenderness or guarding.   Skin:     General: Skin is warm and dry.   Neurological:      Mental Status: She is alert and oriented to person, place, and time.       Karol Chambers PA-C    "

## 2024-03-14 NOTE — ASSESSMENT & PLAN NOTE
Lab Results   Component Value Date    [id number] 3.668 09/11/2023    TSH 1.98 04/08/2022     Continue same dose levothyroxine.

## 2024-03-17 LAB — BACTERIA UR CULT: ABNORMAL

## 2024-03-18 ENCOUNTER — TELEPHONE (OUTPATIENT)
Age: 64
End: 2024-03-18

## 2024-03-18 DIAGNOSIS — N30.00 ACUTE CYSTITIS WITHOUT HEMATURIA: Primary | ICD-10-CM

## 2024-03-18 RX ORDER — AMOXICILLIN AND CLAVULANATE POTASSIUM 875; 125 MG/1; MG/1
1 TABLET, FILM COATED ORAL EVERY 12 HOURS SCHEDULED
Qty: 10 TABLET | Refills: 0 | Status: SHIPPED | OUTPATIENT
Start: 2024-03-18 | End: 2024-03-23

## 2024-05-24 ENCOUNTER — HOSPITAL ENCOUNTER (OUTPATIENT)
Dept: MAMMOGRAPHY | Facility: CLINIC | Age: 64
End: 2024-05-24
Payer: COMMERCIAL

## 2024-05-24 VITALS — HEIGHT: 63 IN | WEIGHT: 156 LBS | BODY MASS INDEX: 27.64 KG/M2

## 2024-05-24 DIAGNOSIS — Z12.31 BREAST CANCER SCREENING BY MAMMOGRAM: ICD-10-CM

## 2024-05-24 PROCEDURE — 77067 SCR MAMMO BI INCL CAD: CPT

## 2024-05-24 PROCEDURE — 77063 BREAST TOMOSYNTHESIS BI: CPT

## 2024-08-31 DIAGNOSIS — F41.9 ANXIETY: ICD-10-CM

## 2024-09-01 RX ORDER — VENLAFAXINE HYDROCHLORIDE 150 MG/1
CAPSULE, EXTENDED RELEASE ORAL
Qty: 90 CAPSULE | Refills: 1 | Status: SHIPPED | OUTPATIENT
Start: 2024-09-01

## 2024-09-09 RX ORDER — PHENTERMINE HYDROCHLORIDE 37.5 MG/1
37.5 TABLET ORAL
COMMUNITY
Start: 2024-07-11

## 2024-09-10 ENCOUNTER — OFFICE VISIT (OUTPATIENT)
Dept: FAMILY MEDICINE CLINIC | Facility: CLINIC | Age: 64
End: 2024-09-10
Payer: COMMERCIAL

## 2024-09-10 VITALS
HEIGHT: 63 IN | OXYGEN SATURATION: 98 % | RESPIRATION RATE: 16 BRPM | BODY MASS INDEX: 27.11 KG/M2 | SYSTOLIC BLOOD PRESSURE: 120 MMHG | HEART RATE: 78 BPM | DIASTOLIC BLOOD PRESSURE: 76 MMHG | WEIGHT: 153 LBS | TEMPERATURE: 97.9 F

## 2024-09-10 DIAGNOSIS — E03.8 HYPOTHYROIDISM DUE TO HASHIMOTO'S THYROIDITIS: ICD-10-CM

## 2024-09-10 DIAGNOSIS — Z23 NEED FOR SHINGLES VACCINE: ICD-10-CM

## 2024-09-10 DIAGNOSIS — M54.2 NECK PAIN ON LEFT SIDE: ICD-10-CM

## 2024-09-10 DIAGNOSIS — E06.3 HYPOTHYROIDISM DUE TO HASHIMOTO'S THYROIDITIS: ICD-10-CM

## 2024-09-10 DIAGNOSIS — Z00.01 ENCOUNTER FOR GENERAL ADULT MEDICAL EXAMINATION WITH ABNORMAL FINDINGS: Primary | ICD-10-CM

## 2024-09-10 PROCEDURE — 99396 PREV VISIT EST AGE 40-64: CPT | Performed by: FAMILY MEDICINE

## 2024-09-10 PROCEDURE — 99214 OFFICE O/P EST MOD 30 MIN: CPT | Performed by: FAMILY MEDICINE

## 2024-09-10 PROCEDURE — 96372 THER/PROPH/DIAG INJ SC/IM: CPT | Performed by: FAMILY MEDICINE

## 2024-09-10 RX ORDER — OMEPRAZOLE 40 MG/1
CAPSULE, DELAYED RELEASE ORAL DAILY
COMMUNITY
Start: 2024-07-25

## 2024-09-10 RX ORDER — ZOSTER VACCINE RECOMBINANT, ADJUVANTED 50 MCG/0.5
0.5 KIT INTRAMUSCULAR ONCE
Qty: 1 EACH | Refills: 1 | Status: SHIPPED | OUTPATIENT
Start: 2024-09-10 | End: 2024-09-10

## 2024-09-10 RX ORDER — TRIAMCINOLONE ACETONIDE 40 MG/ML
40 INJECTION, SUSPENSION INTRA-ARTICULAR; INTRAMUSCULAR
Status: COMPLETED | OUTPATIENT
Start: 2024-09-10 | End: 2024-09-10

## 2024-09-10 RX ADMIN — TRIAMCINOLONE ACETONIDE 40 MG: 40 INJECTION, SUSPENSION INTRA-ARTICULAR; INTRAMUSCULAR at 11:00

## 2024-09-10 NOTE — ASSESSMENT & PLAN NOTE
injected trigger point. See procedure note. Muscle injected: Trapezius, elevatore scapulae, splenius cervicis, scalenus, longissimus cervicis, splenius capitis,   Orders:    Trigger Point Injection

## 2024-09-10 NOTE — PROGRESS NOTES
Ambulatory Visit  Name: Lourdes Jeong      : 1960      MRN: [id number]  Encounter Provider: Jose Armando Nazario MD  Encounter Date: 9/10/2024   Encounter department: [facility]    Assessment & Plan  Encounter for general adult medical examination with abnormal findings  Lourdes is here for a physical exam. I found her without any distress. The patient has the following chronic conditions   Patient Active Problem List   Diagnosis    Mixed hyperlipidemia    Abnormal mammogram    Osteopenia of lumbar spine    Primary hypothyroidism    Vitamin D deficiency    Palpitations    Anxiety    BMI 27.0-27.9,adult    Family history of diabetes mellitus type II    Neck pain on left side    Need for shingles vaccine    Hypothyroidism due to Hashimoto's thyroiditis    Encounter for general adult medical examination with abnormal findings   .   I recommended exercising at least 3 1/2  hours per week and maintaining a healthy diet with low carbohydrates and saturated fat.    I gave her  information about lifestyle modification.    The patient understands the need for an annual physical exam.      Colonoscopy: The patient has a scheduled colonoscopy in November. Ensure all necessary preparations and orders are in place for the procedure.      Medication Management: Review the patient's current medications and address any issues with insurance coverage or pharmacy dispensing.       Hypothyroidism due to Hashimoto's thyroiditis  Thyroid Function Test: The patient is due for a thyroid function test in May. Ensure the test is scheduled and follow up on the results. Continue same dose of Levothyroxine,  Orders:    TSH, 3rd generation; Future    Need for shingles vaccine  Shingles Vaccine: Confirm that the patient has received the shingles vaccine as prescribed by Dr. Freire.  Orders:    Zoster Vac Recomb Adjuvanted (Shingrix) 50 MCG/0.5ML SUSR; Inject 0.5 mL into a muscle once for 1 dose Repeat  dose in 2 to 6 months    Neck pain on left side  injected trigger point. See procedure note. Muscle injected: Trapezius, elevatore scapulae, splenius cervicis, scalenus, longissimus cervicis, splenius capitis,   Orders:    Trigger Point Injection       History of Present Illness       The patient reports running out of meloxicam, which provides some relief for their pain. They describe the pain as feeling like electricity and attribute it to their heavy workload. The patient mentions using diclofenac in the past and expresses a need for a massage. They also report having a colonoscopy scheduled for November and discuss issues with insurance coverage for their medications. Additionally, the patient notes having received the shingles vaccine and mentions a thyroid function test scheduled for May. They express concerns about phlegm production, which they believe is related to their medications.        History obtained from : patient  Review of Systems Neck pain for 3 days,   Current Outpatient Medications on File Prior to Visit   Medication Sig Dispense Refill    omeprazole (PriLOSEC) 40 MG capsule Take by mouth daily      phentermine (ADIPEX-P) 37.5 MG tablet Take 37.5 mg by mouth      ergocalciferol (ERGOCALCIFEROL) 1.25 MG (45900 UT) capsule Take 50,000 Units by mouth every 14 (fourteen) days      levothyroxine 137 mcg tablet Take 1 tablet (137 mcg total) by mouth daily in the early morning 90 tablet 4    linaCLOtide (Linzess) 290 MCG CAPS Take 1 capsule by mouth in the morning 90 capsule 3    pantoprazole (PROTONIX) 20 mg tablet TAKE 1 TABLET(20 MG) BY MOUTH DAILY BEFORE BREAKFAST 90 tablet 1    rosuvastatin (CRESTOR) 5 mg tablet Take 1 tablet (5 mg total) by mouth daily 90 tablet 3    venlafaxine (EFFEXOR-XR) 150 mg 24 hr capsule TAKE 1 CAPSULE(150 MG) BY MOUTH DAILY 90 capsule 1     No current facility-administered medications on file prior to visit.          Objective     /76 (BP Location: Left arm,  "Patient Position: Sitting, Cuff Size: Standard)   Pulse 78   Temp 97.9 °F (36.6 °C) (Tympanic)   Resp 16   Ht 5' 3\" (1.6 m)   Wt 69.4 kg (153 lb)   SpO2 98%   BMI 27.10 kg/m²     Physical Exam  Objective:  The patient appears to be in mild discomfort but is alert and oriented. No acute distress noted. The patient discusses their medication regimen and upcoming medical procedures. They have received the shingles vaccine and have a thyroid function test scheduled. The patient also mentions issues with insurance coverage for their medications.  Administrative Statements   I have spent a total time of 35 minutes in caring for this patient on the day of the visit/encounter including Diagnostic results, Prognosis, Risks and benefits of tx options, Instructions for management, and Patient and family education.     Universal Protocol:  procedure performed by consultantConsent: Verbal consent obtained.  Consent given by: patient  Timeout called at: 9/10/2024 11:32 AM.  Patient understanding: patient states understanding of the procedure being performed  Patient consent: the patient's understanding of the procedure matches consent given  Patient identity confirmed: verbally with patient  Supporting Documentation  Indications: pain   Procedure Details  Location(s):  Needle size: 25 G  Medications: 40 mg triamcinolone acetonide 40 mg/mL  Additional procedure details: Muscle injected: Trapezius, elevatore scapulae, splenius cervicis, scalenus, longissimus cervicis, splenius capitis,              Jose Armando Nazario MD   [facility];  "

## 2024-09-10 NOTE — ASSESSMENT & PLAN NOTE
Thyroid Function Test: The patient is due for a thyroid function test in May. Ensure the test is scheduled and follow up on the results. Continue same dose of Levothyroxine,  Orders:    TSH, 3rd generation; Future

## 2024-09-10 NOTE — ASSESSMENT & PLAN NOTE
Shingles Vaccine: Confirm that the patient has received the shingles vaccine as prescribed by Dr. Freire.  Orders:    Zoster Vac Recomb Adjuvanted (Shingrix) 50 MCG/0.5ML SUSR; Inject 0.5 mL into a muscle once for 1 dose Repeat dose in 2 to 6 months

## 2024-09-10 NOTE — ASSESSMENT & PLAN NOTE
Lourdes is here for a physical exam. I found her without any distress. The patient has the following chronic conditions   Patient Active Problem List   Diagnosis    Mixed hyperlipidemia    Abnormal mammogram    Osteopenia of lumbar spine    Primary hypothyroidism    Vitamin D deficiency    Palpitations    Anxiety    BMI 27.0-27.9,adult    Family history of diabetes mellitus type II    Neck pain on left side    Need for shingles vaccine    Hypothyroidism due to Hashimoto's thyroiditis    Encounter for general adult medical examination with abnormal findings   .   I recommended exercising at least 3 1/2  hours per week and maintaining a healthy diet with low carbohydrates and saturated fat.    I gave her  information about lifestyle modification.    The patient understands the need for an annual physical exam.      Colonoscopy: The patient has a scheduled colonoscopy in November. Ensure all necessary preparations and orders are in place for the procedure.      Medication Management: Review the patient's current medications and address any issues with insurance coverage or pharmacy dispensing.

## 2024-09-26 DIAGNOSIS — R10.13 EPIGASTRIC PAIN: ICD-10-CM

## 2024-09-26 RX ORDER — PANTOPRAZOLE SODIUM 20 MG/1
TABLET, DELAYED RELEASE ORAL
Qty: 90 TABLET | Refills: 1 | Status: SHIPPED | OUTPATIENT
Start: 2024-09-26

## 2024-10-10 PROBLEM — Z00.01 ENCOUNTER FOR GENERAL ADULT MEDICAL EXAMINATION WITH ABNORMAL FINDINGS: Status: RESOLVED | Noted: 2024-09-10 | Resolved: 2024-10-10

## 2024-10-15 DIAGNOSIS — F41.9 ANXIETY: ICD-10-CM

## 2024-10-16 RX ORDER — VENLAFAXINE HYDROCHLORIDE 150 MG/1
CAPSULE, EXTENDED RELEASE ORAL
Qty: 90 CAPSULE | Refills: 1 | Status: SHIPPED | OUTPATIENT
Start: 2024-10-16

## 2024-11-10 DIAGNOSIS — E03.9 HYPOTHYROIDISM, UNSPECIFIED TYPE: ICD-10-CM

## 2024-11-12 RX ORDER — LEVOTHYROXINE SODIUM 137 UG/1
137 TABLET ORAL
Qty: 90 TABLET | Refills: 1 | Status: SHIPPED | OUTPATIENT
Start: 2024-11-12

## 2025-02-12 DIAGNOSIS — E03.9 HYPOTHYROIDISM, UNSPECIFIED TYPE: ICD-10-CM

## 2025-02-12 RX ORDER — LEVOTHYROXINE SODIUM 137 UG/1
TABLET ORAL
Qty: 90 TABLET | Refills: 1 | OUTPATIENT
Start: 2025-02-12

## 2025-02-14 NOTE — PROGRESS NOTES
Assessment/Plan:  1  Need for Tdap vaccination    - TDAP VACCINE GREATER THAN OR EQUAL TO 6YO IM    2  Fibromyalgia  The use OTC anti-inflammatories and pain medications  Keep exercising and stretching  Try yoga  3  Chronic pain disorder  Check for other collagen disease   - LAZARO Screen w/ Reflex to Titer/Pattern; Future    4  Encounter for general adult medical examination with abnormal findings  During this visit we have a goal to personalize prevention  I discussed the patient new illness and  and decrease the risk of current problems  Health risk assessment was discussed with patient today and the ways to stay healthier  We reviewed also the current medications in his treatment regimen  Discussed about how the chronic conditions are impacting now and later  Recommended a healthy diet and exercising frequently will help to control better michelle's current chronic conditions  We also discussed about  Vaccinations, and the need to compliance with them  Patient declined at this time advanced directives  Patient will discuss with family regarding advanced directives  - CBC and differential; Future  - Comprehensive metabolic panel; Future  - Lipid panel; Future    5  Hyperglycemia  To rule out diabetes  - HEMOGLOBIN A1C W/ EAG ESTIMATION; Future    6  Other specified hypothyroidism  Recheck TSH  - TSH, 3rd generation; Future    No problem-specific Assessment & Plan notes found for this encounter  Diagnoses and all orders for this visit:    Need for Tdap vaccination  -     TDAP VACCINE GREATER THAN OR EQUAL TO 6YO IM    Other orders  -     levothyroxine 137 mcg tablet; TKA HALF T PO Q MORNING  -     CBC and differential; Future  -     Comprehensive metabolic panel; Future  -     HEMOGLOBIN A1C W/ EAG ESTIMATION; Future  -     Lipid panel; Future  -     TSH, 3rd generation; Future          Subjective:      Patient ID: Hallie Byrne is a 60 y o  female      Patient is here for PE, not having any acute Subjective   Patient ID: Sabina Chopra is a 68 y.o. female who presents for Establish Care.    NAMITA Muhammad is a 68-year-old  female with history of multiple medical problems who comes to establish primary care.  She has been checking her blood glucose readings for the past couple weeks and they have been in the low to mid 100 send she is concerned about diabetes because she has gained weight as well.  Patient has history of hypothyroidism but apparently is currently not taking levothyroxine.  TSH done over the past few years has been normal.  Patient sees rheumatology for history of osteoporosis for which she is on Reclast infusions.  Patient has been advised to discuss with her rheumatologist regarding getting follow-up bone DEXA per protocol.      Patient also has history of SVT/palpitations, recurrent syncope and has a loop recorder in place-follows up with cardiology.  Patient did have a colectomy done over 15 years ago and has incontinence issues and sees gastroenterology.  Patient smokes a pack of cigarettes a day for over 30 years and she is agreeable to getting a low-dose CT of the chest for screening purposes.  Last colonoscopy was done in January 2019-5 to 10-year follow-up was recommended, patient claims she does not get colonoscopies due to history of colectomy and that she will discuss with her gastroenterologist regarding possibly getting flex sig.    Patient lives with her , she is independent in ADLs and IADLs, ambulates without aid or assistance.  She was in the ED in December 2020 for and labs done then were reviewed and discussed with patient-unremarkable for the most part except for slightly elevated blood glucose.  No history of fever, chills, chest pain, shortness of breath, cough, abdominal pain, nausea, vomiting, loss of weight, loss of appetite, dysuria, hematuria, melena, rectal bleeding.  Patient has been experiencing increased fatigue and somnolence lately.  She does see  "a psychiatrist for history of attention deficit disorder as well as sleep disorder, NUBIA and depression for which she is being prescribed several meds including Adderall.  Review of Systems  As per HPI.  Patient is agreeable to getting the flu vaccine today and she is also due for mammogram which will be ordered.  Objective   /80 (BP Location: Right arm, Patient Position: Sitting, BP Cuff Size: Large adult)   Ht 1.6 m (5' 3\")   Wt 68 kg (150 lb)   BMI 26.57 kg/m²     Physical Exam    Assessment/Plan     1.  Weight gain, hyperglycemia-hemoglobin A1c will be checked  2.  Fatigue associated with weight gain-Labs including CMP, CBC, TSH, vitamin D 25-hydroxy level and B12 will be checked  3.  Osteoporosis-patient is on Reclast infusions and she will follow-up with rheumatology and get bone DEXA as recommended  4.  Nicotine dependence, screening for lung cancer-low-dose CT of the chest will be ordered  5.  GERD-patient is on PPI and will follow-up with GI as scheduled  6.  Need for high-dose flu vaccination-administered today  7.  Depression, anxiety and attention deficit disorder-patient is being managed by her psychiatrist and will follow-up with them as scheduled  8.  History of hypothyroidism-patient is currently not on levothyroxine, TSH will be checked  9.  Tobacco abuse-patient has been counseled to stop smoking cigarettes  10.  History of recurrent syncope, SVT/palpitations-patient has a loop recorder in place and will follow-up with cardiology as scheduled  11.  Status post colectomy with chronic diarrhea-patient will follow-up with gastroenterologist as recommended  12.  Polymyalgia rheumatica-management per rheumatology  13.  Sleep disorder-patient is being managed by her psychiatrist and she is on Nuvigil  14.  Encounter to establish care in a patient with history of multiple medical problems-she is due for a high-dose flu vaccine which will be administered, mammogram has been ordered and she will " distress  The following portions of the patient's history were reviewed and updated as appropriate: allergies, current medications, past family history, past medical history, past social history, past surgical history and problem list     Review of Systems   Constitutional: Negative for fatigue, fever and unexpected weight change  HENT: Negative for sore throat and trouble swallowing  Eyes: Negative for photophobia  Respiratory: Negative for cough, chest tightness, shortness of breath and wheezing  Cardiovascular: Negative for chest pain, palpitations and leg swelling  Gastrointestinal: Negative for abdominal pain, blood in stool, nausea and vomiting  Genitourinary: Negative for hematuria  Musculoskeletal: Positive for arthralgias (Multiple areas with pain)  Neurological: Negative for dizziness, syncope, light-headedness and headaches  Hematological: Does not bruise/bleed easily  Objective:      /70 (BP Location: Left arm, Patient Position: Sitting, Cuff Size: Standard)   Pulse 62   Temp 98 2 °F (36 8 °C) (Oral)   Resp 16   Ht 5' 5" (1 651 m)   Wt 66 7 kg (147 lb)   SpO2 98%   Breastfeeding? No   BMI 24 46 kg/m²          Physical Exam   HENT:   Head: Normocephalic  Eyes: Pupils are equal, round, and reactive to light  EOM are normal    Neck: Neck supple  Cardiovascular: Normal rate and regular rhythm  Pulmonary/Chest: Effort normal    Abdominal: Soft  Musculoskeletal:   Multiple  points of pain   Neurological: She is alert  Abnormal muscle tone:  diagmed  Skin: Skin is warm and dry  Psychiatric: She has a normal mood and affect   Her behavior is normal  discuss with her gastroenterologist regarding possibly getting a flex sig and with her rheumatologist regarding bone DEXA  Follow-up in 1 to 2 months.  60 minutes spent rooming the patient, reviewing records, eliciting history, examining patient, counseling, coordination of care and in documentation.    This note was partially generated using the Dragon voice recognition system. There may be some incorrect words, spelling and punctuation errors that were not corrected prior to committing the note to the patient's medical record.   Patient was identified as a fall risk. Risk prevention instructions provided.Patient was identified as a fall risk. Risk prevention instructions provided.

## 2025-02-26 ENCOUNTER — HOSPITAL ENCOUNTER (OUTPATIENT)
Dept: BONE DENSITY | Facility: CLINIC | Age: 65
Discharge: HOME | End: 2025-02-26
Payer: COMMERCIAL

## 2025-02-26 VITALS — WEIGHT: 150 LBS | BODY MASS INDEX: 26.58 KG/M2 | HEIGHT: 63 IN

## 2025-02-26 DIAGNOSIS — Z13.820 SCREENING FOR OSTEOPOROSIS: ICD-10-CM

## 2025-02-26 DIAGNOSIS — M81.0 OSTEOPOROSIS WITHOUT CURRENT PATHOLOGICAL FRACTURE, UNSPECIFIED OSTEOPOROSIS TYPE: ICD-10-CM

## 2025-02-26 DIAGNOSIS — Q78.2 OSTEOPETROSIS: ICD-10-CM

## 2025-02-26 PROCEDURE — 77080 DXA BONE DENSITY AXIAL: CPT

## 2025-03-02 ENCOUNTER — RESULTS FOLLOW-UP (OUTPATIENT)
Dept: FAMILY MEDICINE CLINIC | Facility: CLINIC | Age: 65
End: 2025-03-02

## 2025-03-02 NOTE — RESULT ENCOUNTER NOTE
Dear Lourdes, DXA showed that you have osteopenia, it is essential to adopt a multifaceted approach to enhance bone health and reduce the risk of progression to osteoporosis. This includes engaging in regular weight-bearing and resistance exercises, which can help strengthen bones and improve balance. A diet rich in calcium and vitamin D is crucial; consider incorporating dairy products, leafy greens, and fortified foods, along with safe sun exposure or supplements if necessary. Additionally, lifestyle modifications such as avoiding smoking and limiting alcohol intake can further support bone density. Regular monitoring through bone density scans and consultations with a healthcare provider can help tailor a personalized management plan, ensuring optimal bone health and minimizing fracture risk.    Purchase Caltrate D 600 and take 1 tablet twice a day.

## 2025-03-21 DIAGNOSIS — R10.13 EPIGASTRIC PAIN: ICD-10-CM

## 2025-03-21 RX ORDER — PANTOPRAZOLE SODIUM 20 MG/1
TABLET, DELAYED RELEASE ORAL
Qty: 90 TABLET | Refills: 1 | Status: SHIPPED | OUTPATIENT
Start: 2025-03-21

## 2025-08-13 ENCOUNTER — OFFICE VISIT (OUTPATIENT)
Dept: FAMILY MEDICINE CLINIC | Facility: CLINIC | Age: 65
End: 2025-08-13
Payer: COMMERCIAL

## 2025-08-18 ENCOUNTER — HOSPITAL ENCOUNTER (OUTPATIENT)
Dept: RADIOLOGY | Facility: HOSPITAL | Age: 65
Discharge: HOME | End: 2025-08-18
Payer: COMMERCIAL

## 2025-08-18 DIAGNOSIS — J34.89 SINUS PRESSURE: ICD-10-CM

## 2025-08-18 PROCEDURE — 70220 X-RAY EXAM OF SINUSES: CPT

## 2025-08-22 ENCOUNTER — TELEPHONE (OUTPATIENT)
Dept: FAMILY MEDICINE CLINIC | Facility: CLINIC | Age: 65
End: 2025-08-22

## 2025-08-22 ENCOUNTER — TELEMEDICINE (OUTPATIENT)
Dept: FAMILY MEDICINE CLINIC | Facility: CLINIC | Age: 65
End: 2025-08-22
Payer: COMMERCIAL

## 2025-08-22 ENCOUNTER — TELEPHONE (OUTPATIENT)
Age: 65
End: 2025-08-22

## 2025-08-22 VITALS
TEMPERATURE: 94.3 F | OXYGEN SATURATION: 96 % | BODY MASS INDEX: 24.8 KG/M2 | SYSTOLIC BLOOD PRESSURE: 113 MMHG | DIASTOLIC BLOOD PRESSURE: 62 MMHG | WEIGHT: 140 LBS

## 2025-08-22 DIAGNOSIS — U07.1 COVID-19: Primary | ICD-10-CM

## 2025-08-22 DIAGNOSIS — R73.03 PREDIABETES: ICD-10-CM

## 2025-08-22 DIAGNOSIS — J34.89 SINUS PRESSURE: ICD-10-CM

## 2025-08-22 DIAGNOSIS — E78.2 MIXED HYPERLIPIDEMIA: ICD-10-CM

## 2025-08-22 DIAGNOSIS — E03.9 PRIMARY HYPOTHYROIDISM: ICD-10-CM

## 2025-08-22 DIAGNOSIS — R30.0 DYSURIA: ICD-10-CM

## 2025-08-22 PROBLEM — Z86.0100 HISTORY OF COLON POLYPS: Status: ACTIVE | Noted: 2025-08-22

## 2025-08-22 PROCEDURE — 99214 OFFICE O/P EST MOD 30 MIN: CPT | Performed by: PHYSICIAN ASSISTANT

## 2025-08-22 RX ORDER — NIRMATRELVIR AND RITONAVIR 300-100 MG
3 KIT ORAL 2 TIMES DAILY
Qty: 30 TABLET | Refills: 0 | Status: SHIPPED | OUTPATIENT
Start: 2025-08-22 | End: 2025-08-27

## 2025-08-22 RX ORDER — DEXTROMETHORPHAN HYDROBROMIDE AND PROMETHAZINE HYDROCHLORIDE 15; 6.25 MG/5ML; MG/5ML
5 SYRUP ORAL 4 TIMES DAILY PRN
Qty: 240 ML | Refills: 0 | Status: SHIPPED | OUTPATIENT
Start: 2025-08-22